# Patient Record
Sex: FEMALE | Race: WHITE | NOT HISPANIC OR LATINO | Employment: FULL TIME | URBAN - METROPOLITAN AREA
[De-identification: names, ages, dates, MRNs, and addresses within clinical notes are randomized per-mention and may not be internally consistent; named-entity substitution may affect disease eponyms.]

---

## 2024-01-04 ENCOUNTER — LAB (OUTPATIENT)
Dept: OBSTETRICS AND GYNECOLOGY | Facility: CLINIC | Age: 29
End: 2024-01-04
Payer: COMMERCIAL

## 2024-01-04 DIAGNOSIS — R73.09 ELEVATED GLUCOSE TOLERANCE TEST: Primary | ICD-10-CM

## 2024-01-10 ENCOUNTER — TELEPHONE (OUTPATIENT)
Dept: OBSTETRICS AND GYNECOLOGY | Facility: CLINIC | Age: 29
End: 2024-01-10
Payer: COMMERCIAL

## 2024-01-12 ENCOUNTER — ROUTINE PRENATAL (OUTPATIENT)
Dept: OBSTETRICS AND GYNECOLOGY | Facility: CLINIC | Age: 29
End: 2024-01-12
Payer: COMMERCIAL

## 2024-01-12 ENCOUNTER — HOSPITAL ENCOUNTER (INPATIENT)
Facility: HOSPITAL | Age: 29
LOS: 8 days | Discharge: HOME | End: 2024-01-20
Attending: OBSTETRICS & GYNECOLOGY | Admitting: OBSTETRICS & GYNECOLOGY
Payer: COMMERCIAL

## 2024-01-12 ENCOUNTER — TELEPHONE (OUTPATIENT)
Dept: OBSTETRICS AND GYNECOLOGY | Facility: CLINIC | Age: 29
End: 2024-01-12
Payer: COMMERCIAL

## 2024-01-12 VITALS — WEIGHT: 134 LBS | BODY MASS INDEX: 23 KG/M2 | SYSTOLIC BLOOD PRESSURE: 104 MMHG | DIASTOLIC BLOOD PRESSURE: 68 MMHG

## 2024-01-12 DIAGNOSIS — Z34.03 PRENATAL CARE, FIRST PREGNANCY, THIRD TRIMESTER: Primary | ICD-10-CM

## 2024-01-12 DIAGNOSIS — O60.03 PRETERM LABOR IN THIRD TRIMESTER WITHOUT DELIVERY: ICD-10-CM

## 2024-01-12 PROBLEM — O60.00 PRETERM LABOR: Status: ACTIVE | Noted: 2024-01-12

## 2024-01-12 LAB
ABO GROUP BLD: NORMAL
ABO GROUP BLD: NORMAL
ALP SERPL-CCNC: 98 U/L (ref 39–117)
ALT SERPL W P-5'-P-CCNC: 10 U/L (ref 1–33)
AST SERPL-CCNC: 15 U/L (ref 1–32)
BACTERIA UR QL AUTO: ABNORMAL /HPF
BASOPHILS # BLD AUTO: 0.08 10*3/MM3 (ref 0–0.2)
BASOPHILS NFR BLD AUTO: 0.7 % (ref 0–1.5)
BILIRUB BLD-MCNC: NEGATIVE MG/DL
BILIRUB SERPL-MCNC: 0.2 MG/DL (ref 0–1.2)
BILIRUB UR QL STRIP: NEGATIVE
BLD GP AB SCN SERPL QL: NEGATIVE
CLARITY UR: CLEAR
CLARITY, POC: CLEAR
COLOR UR: YELLOW
COLOR UR: YELLOW
CREAT SERPL-MCNC: 0.58 MG/DL (ref 0.57–1)
DEPRECATED RDW RBC AUTO: 41.3 FL (ref 37–54)
EOSINOPHIL # BLD AUTO: 0.15 10*3/MM3 (ref 0–0.4)
EOSINOPHIL NFR BLD AUTO: 1.2 % (ref 0.3–6.2)
ERYTHROCYTE [DISTWIDTH] IN BLOOD BY AUTOMATED COUNT: 12.6 % (ref 12.3–15.4)
GLUCOSE UR STRIP-MCNC: NEGATIVE MG/DL
GLUCOSE UR STRIP-MCNC: NEGATIVE MG/DL
HCT VFR BLD AUTO: 35.4 % (ref 34–46.6)
HGB BLD-MCNC: 12.3 G/DL (ref 12–15.9)
HGB UR QL STRIP.AUTO: ABNORMAL
HYALINE CASTS UR QL AUTO: ABNORMAL /LPF
IMM GRANULOCYTES # BLD AUTO: 0.06 10*3/MM3 (ref 0–0.05)
IMM GRANULOCYTES NFR BLD AUTO: 0.5 % (ref 0–0.5)
KETONES UR QL STRIP: NEGATIVE
KETONES UR QL: NEGATIVE
LDH SERPL-CCNC: 207 U/L (ref 135–214)
LEUKOCYTE EST, POC: NEGATIVE
LEUKOCYTE ESTERASE UR QL STRIP.AUTO: NEGATIVE
LYMPHOCYTES # BLD AUTO: 2.54 10*3/MM3 (ref 0.7–3.1)
LYMPHOCYTES NFR BLD AUTO: 20.7 % (ref 19.6–45.3)
MCH RBC QN AUTO: 31.7 PG (ref 26.6–33)
MCHC RBC AUTO-ENTMCNC: 34.7 G/DL (ref 31.5–35.7)
MCV RBC AUTO: 91.2 FL (ref 79–97)
MONOCYTES # BLD AUTO: 0.69 10*3/MM3 (ref 0.1–0.9)
MONOCYTES NFR BLD AUTO: 5.6 % (ref 5–12)
NEUTROPHILS NFR BLD AUTO: 71.3 % (ref 42.7–76)
NEUTROPHILS NFR BLD AUTO: 8.77 10*3/MM3 (ref 1.7–7)
NITRITE UR QL STRIP: NEGATIVE
NITRITE UR-MCNC: NEGATIVE MG/ML
NRBC BLD AUTO-RTO: 0 /100 WBC (ref 0–0.2)
PH UR STRIP.AUTO: 6.5 [PH] (ref 5–8)
PH UR: 6 [PH] (ref 5–8)
PLATELET # BLD AUTO: 199 10*3/MM3 (ref 140–450)
PMV BLD AUTO: 10.9 FL (ref 6–12)
PROT UR QL STRIP: NEGATIVE
PROT UR STRIP-MCNC: NEGATIVE MG/DL
RBC # BLD AUTO: 3.88 10*6/MM3 (ref 3.77–5.28)
RBC # UR STRIP: ABNORMAL /HPF
RBC # UR STRIP: NEGATIVE /UL
REF LAB TEST METHOD: ABNORMAL
RH BLD: POSITIVE
RH BLD: POSITIVE
SP GR UR STRIP: 1.01 (ref 1–1.03)
SP GR UR: 1.01 (ref 1–1.03)
SQUAMOUS #/AREA URNS HPF: ABNORMAL /HPF
T&S EXPIRATION DATE: NORMAL
URATE SERPL-MCNC: 2.7 MG/DL (ref 2.4–5.7)
UROBILINOGEN UR QL STRIP: ABNORMAL
UROBILINOGEN UR QL: NORMAL
WBC # UR STRIP: ABNORMAL /HPF
WBC NRBC COR # BLD AUTO: 12.29 10*3/MM3 (ref 3.4–10.8)

## 2024-01-12 PROCEDURE — 86900 BLOOD TYPING SEROLOGIC ABO: CPT | Performed by: OBSTETRICS & GYNECOLOGY

## 2024-01-12 PROCEDURE — 59025 FETAL NON-STRESS TEST: CPT | Performed by: OBSTETRICS & GYNECOLOGY

## 2024-01-12 PROCEDURE — 25010000002 TERBUTALINE PER 1 MG

## 2024-01-12 PROCEDURE — 99222 1ST HOSP IP/OBS MODERATE 55: CPT | Performed by: OBSTETRICS & GYNECOLOGY

## 2024-01-12 PROCEDURE — 84550 ASSAY OF BLOOD/URIC ACID: CPT | Performed by: OBSTETRICS & GYNECOLOGY

## 2024-01-12 PROCEDURE — 25010000002 ONDANSETRON PER 1 MG: Performed by: OBSTETRICS & GYNECOLOGY

## 2024-01-12 PROCEDURE — 25010000002 CEFAZOLIN PER 500 MG: Performed by: OBSTETRICS & GYNECOLOGY

## 2024-01-12 PROCEDURE — 82247 BILIRUBIN TOTAL: CPT | Performed by: OBSTETRICS & GYNECOLOGY

## 2024-01-12 PROCEDURE — 86901 BLOOD TYPING SEROLOGIC RH(D): CPT

## 2024-01-12 PROCEDURE — 25810000003 LACTATED RINGERS PER 1000 ML: Performed by: OBSTETRICS & GYNECOLOGY

## 2024-01-12 PROCEDURE — 82565 ASSAY OF CREATININE: CPT | Performed by: OBSTETRICS & GYNECOLOGY

## 2024-01-12 PROCEDURE — 86850 RBC ANTIBODY SCREEN: CPT | Performed by: OBSTETRICS & GYNECOLOGY

## 2024-01-12 PROCEDURE — 25010000002 BETAMETHASONE ACET & SOD PHOS PER 4 MG: Performed by: OBSTETRICS & GYNECOLOGY

## 2024-01-12 PROCEDURE — 84075 ASSAY ALKALINE PHOSPHATASE: CPT | Performed by: OBSTETRICS & GYNECOLOGY

## 2024-01-12 PROCEDURE — 83615 LACTATE (LD) (LDH) ENZYME: CPT | Performed by: OBSTETRICS & GYNECOLOGY

## 2024-01-12 PROCEDURE — 84460 ALANINE AMINO (ALT) (SGPT): CPT | Performed by: OBSTETRICS & GYNECOLOGY

## 2024-01-12 PROCEDURE — 59025 FETAL NON-STRESS TEST: CPT

## 2024-01-12 PROCEDURE — 25010000002 MAGNESIUM SULFATE 20 GM/500ML SOLUTION: Performed by: OBSTETRICS & GYNECOLOGY

## 2024-01-12 PROCEDURE — 25810000003 LACTATED RINGERS SOLUTION: Performed by: OBSTETRICS & GYNECOLOGY

## 2024-01-12 PROCEDURE — 87081 CULTURE SCREEN ONLY: CPT | Performed by: OBSTETRICS & GYNECOLOGY

## 2024-01-12 PROCEDURE — 87086 URINE CULTURE/COLONY COUNT: CPT | Performed by: OBSTETRICS & GYNECOLOGY

## 2024-01-12 PROCEDURE — 81001 URINALYSIS AUTO W/SCOPE: CPT | Performed by: OBSTETRICS & GYNECOLOGY

## 2024-01-12 PROCEDURE — 85025 COMPLETE CBC W/AUTO DIFF WBC: CPT | Performed by: OBSTETRICS & GYNECOLOGY

## 2024-01-12 PROCEDURE — 84450 TRANSFERASE (AST) (SGOT): CPT | Performed by: OBSTETRICS & GYNECOLOGY

## 2024-01-12 PROCEDURE — 86900 BLOOD TYPING SEROLOGIC ABO: CPT

## 2024-01-12 PROCEDURE — 86901 BLOOD TYPING SEROLOGIC RH(D): CPT | Performed by: OBSTETRICS & GYNECOLOGY

## 2024-01-12 RX ORDER — TERBUTALINE SULFATE 1 MG/ML
INJECTION, SOLUTION SUBCUTANEOUS
Status: COMPLETED
Start: 2024-01-12 | End: 2024-01-12

## 2024-01-12 RX ORDER — SODIUM CHLORIDE, SODIUM LACTATE, POTASSIUM CHLORIDE, CALCIUM CHLORIDE 600; 310; 30; 20 MG/100ML; MG/100ML; MG/100ML; MG/100ML
75 INJECTION, SOLUTION INTRAVENOUS CONTINUOUS
Status: DISCONTINUED | OUTPATIENT
Start: 2024-01-12 | End: 2024-01-18 | Stop reason: SDUPTHER

## 2024-01-12 RX ORDER — SODIUM CHLORIDE 0.9 % (FLUSH) 0.9 %
10 SYRINGE (ML) INJECTION EVERY 12 HOURS SCHEDULED
Status: DISCONTINUED | OUTPATIENT
Start: 2024-01-12 | End: 2024-01-18

## 2024-01-12 RX ORDER — ONDANSETRON 4 MG/1
8 TABLET, ORALLY DISINTEGRATING ORAL EVERY 8 HOURS PRN
Status: DISCONTINUED | OUTPATIENT
Start: 2024-01-12 | End: 2024-01-18 | Stop reason: SDUPTHER

## 2024-01-12 RX ORDER — ONDANSETRON 2 MG/ML
4 INJECTION INTRAMUSCULAR; INTRAVENOUS EVERY 8 HOURS PRN
Status: DISCONTINUED | OUTPATIENT
Start: 2024-01-12 | End: 2024-01-18 | Stop reason: SDUPTHER

## 2024-01-12 RX ORDER — DOCUSATE SODIUM 100 MG/1
100 CAPSULE, LIQUID FILLED ORAL 2 TIMES DAILY PRN
Status: DISCONTINUED | OUTPATIENT
Start: 2024-01-12 | End: 2024-01-18

## 2024-01-12 RX ORDER — MAGNESIUM SULFATE HEPTAHYDRATE 40 MG/ML
2 INJECTION, SOLUTION INTRAVENOUS CONTINUOUS
Status: DISCONTINUED | OUTPATIENT
Start: 2024-01-12 | End: 2024-01-14

## 2024-01-12 RX ORDER — INDOMETHACIN 25 MG/1
25 CAPSULE ORAL EVERY 4 HOURS
Status: COMPLETED | OUTPATIENT
Start: 2024-01-12 | End: 2024-01-14

## 2024-01-12 RX ORDER — CALCIUM CARBONATE 500 MG/1
1 TABLET, CHEWABLE ORAL DAILY PRN
Status: DISCONTINUED | OUTPATIENT
Start: 2024-01-12 | End: 2024-01-18 | Stop reason: ALTCHOICE

## 2024-01-12 RX ORDER — LIDOCAINE HYDROCHLORIDE 10 MG/ML
0.5 INJECTION, SOLUTION EPIDURAL; INFILTRATION; INTRACAUDAL; PERINEURAL ONCE AS NEEDED
Status: DISCONTINUED | OUTPATIENT
Start: 2024-01-12 | End: 2024-01-18 | Stop reason: SDUPTHER

## 2024-01-12 RX ORDER — INDOMETHACIN 25 MG/1
50 CAPSULE ORAL ONCE
Status: COMPLETED | OUTPATIENT
Start: 2024-01-12 | End: 2024-01-12

## 2024-01-12 RX ORDER — SODIUM CHLORIDE 0.9 % (FLUSH) 0.9 %
10 SYRINGE (ML) INJECTION AS NEEDED
Status: DISCONTINUED | OUTPATIENT
Start: 2024-01-12 | End: 2024-01-18 | Stop reason: SDUPTHER

## 2024-01-12 RX ORDER — SODIUM CHLORIDE 9 MG/ML
40 INJECTION, SOLUTION INTRAVENOUS AS NEEDED
Status: DISCONTINUED | OUTPATIENT
Start: 2024-01-12 | End: 2024-01-18 | Stop reason: SDUPTHER

## 2024-01-12 RX ORDER — ACETAMINOPHEN 325 MG/1
650 TABLET ORAL EVERY 4 HOURS PRN
Status: DISCONTINUED | OUTPATIENT
Start: 2024-01-12 | End: 2024-01-18 | Stop reason: SDUPTHER

## 2024-01-12 RX ORDER — BISACODYL 10 MG
10 SUPPOSITORY, RECTAL RECTAL DAILY PRN
Status: DISCONTINUED | OUTPATIENT
Start: 2024-01-12 | End: 2024-01-18

## 2024-01-12 RX ORDER — BETAMETHASONE SODIUM PHOSPHATE AND BETAMETHASONE ACETATE 3; 3 MG/ML; MG/ML
12 INJECTION, SUSPENSION INTRA-ARTICULAR; INTRALESIONAL; INTRAMUSCULAR; SOFT TISSUE EVERY 24 HOURS
Status: COMPLETED | OUTPATIENT
Start: 2024-01-12 | End: 2024-01-13

## 2024-01-12 RX ADMIN — INDOMETHACIN 25 MG: 25 CAPSULE ORAL at 23:03

## 2024-01-12 RX ADMIN — SODIUM CHLORIDE, POTASSIUM CHLORIDE, SODIUM LACTATE AND CALCIUM CHLORIDE 50 ML/HR: 600; 310; 30; 20 INJECTION, SOLUTION INTRAVENOUS at 19:00

## 2024-01-12 RX ADMIN — SODIUM CHLORIDE, POTASSIUM CHLORIDE, SODIUM LACTATE AND CALCIUM CHLORIDE 1000 ML: 600; 310; 30; 20 INJECTION, SOLUTION INTRAVENOUS at 14:55

## 2024-01-12 RX ADMIN — INDOMETHACIN 50 MG: 25 CAPSULE ORAL at 15:58

## 2024-01-12 RX ADMIN — ONDANSETRON 4 MG: 2 INJECTION INTRAMUSCULAR; INTRAVENOUS at 20:43

## 2024-01-12 RX ADMIN — INDOMETHACIN 25 MG: 25 CAPSULE ORAL at 19:48

## 2024-01-12 RX ADMIN — SODIUM CHLORIDE 2000 MG: 900 INJECTION INTRAVENOUS at 15:05

## 2024-01-12 RX ADMIN — Medication 10 ML: at 21:00

## 2024-01-12 RX ADMIN — BETAMETHASONE SODIUM PHOSPHATE AND BETAMETHASONE ACETATE 12 MG: 3; 3 INJECTION, SUSPENSION INTRA-ARTICULAR; INTRALESIONAL; INTRAMUSCULAR at 15:05

## 2024-01-12 RX ADMIN — MAGNESIUM SULFATE HEPTAHYDRATE 3 G/HR: 40 INJECTION, SOLUTION INTRAVENOUS at 19:57

## 2024-01-12 RX ADMIN — CEFAZOLIN 1000 MG: 1 INJECTION, POWDER, FOR SOLUTION INTRAMUSCULAR; INTRAVENOUS at 22:07

## 2024-01-12 RX ADMIN — TERBUTALINE SULFATE 1 MG: 1 INJECTION, SOLUTION SUBCUTANEOUS at 16:00

## 2024-01-12 NOTE — PROGRESS NOTES
"      OB FOLLOW UP  CC- Here for care of pregnancy        Monica Lees is a 28 y.o.  30w3d patient being seen today for c/o frequent mild cramping x 15 hours.  She also c/o increased clear watery discharge. Patient reports when she stood up this morning \"it just came out\". Over the past hour she has had 8 cramps/contractions that she's been able to track. Patient reports when she wiped this morning she had a brownish tinge on her toilet paper now it is red.(Only when wiping). Patient reports she has been feeling like she wants to have a BM.     Her prenatal care is complicated by (and status) :    Patient Active Problem List   Diagnosis    Anxiety    Eating disorder    Pregnancy     labor         Ultrasound Today: No.  NST Today: Yes .  Reactive, 20+ minutes, indication: cramping and spotting  ROS -   Patient Reports :  see above  Patient Denies: Loss of Fluid, Vaginal Spotting, Vision Changes, Headaches, Nausea , and Vomiting   Fetal Movement : normal  All other systems reviewed and are negative.       The additional following portions of the patient's history were reviewed and updated as appropriate: allergies and current medications.    I have reviewed and agree with the HPI, ROS, and historical information as entered above. Kaya Payton, APRN      /68   Wt 60.8 kg (134 lb)   BMI 23.00 kg/m²       EXAM:     Prenatal Vitals  BP: 104/68  Weight: 60.8 kg (134 lb)        Cervix 3 cm, vertex.  Speculum exam: no pooling, bleeding; cervix dilated             Assessment and Plan    Problem List Items Addressed This Visit          Gravid and      labor     Other Visit Diagnoses       Prenatal care, first pregnancy, third trimester    -  Primary    Relevant Orders    POC Urinalysis Dipstick (Completed)            Pregnancy at 30w3d  Cervix dilated.  To .  Notified charge nurse    Kaya Payton, KATIE  2024   "

## 2024-01-12 NOTE — TELEPHONE ENCOUNTER
33 37- [place] patient    Patient called with c/o frequent mild cramping x 15 hours.  She also c/o increased watery discharge.  Over the past hour she has had 8 cramps/contractions that she's been able to track.  She denies vaginal bleeding, and admits adequate fetal movement.    Patient scheduled for appt in [place] with Marcia at 12:30.

## 2024-01-12 NOTE — NURSING NOTE
PRENATAL CONTACT - NDRT    Requested by OB to meet with parents to update them with delivery and admission procedures for their infant.    Present:  Mom and Dad    Pertinent Prenatal Information:  None    Gestational Age: 30 3/7 weeks  US report:Normal at the first anatomy scan  Other: ALINE, dilated, bulging bag      The following issues were discussed:    1) Admission Procedure.  2) NICU Visitation Policy.  3) Sibling Visitation Policy: NA  4) Skin to Skin Care ([facility]).  5) Hand Expression for Breast Milk soon after birth.  6) Breast Feeding/Expressing Breast Milk.  7) Tour of NICU offered.  8) Other Issues Discussed: Length of stay, pump for preemies      Concerns Voiced by Parents: Concerned of length of stay         Evelyn Reagan RN    1/12/2024   16:24 EST

## 2024-01-12 NOTE — PROGRESS NOTES
LAborist    Chart reviewed.      Urinalysis normal limit  CBC and preeclampsia with normal limit  Fetal heart rate tracing   reactive with continued contractions 3 to 5 minutes apart.    PLAN  Increase magnesium sulfate to 3 g/h, add Indocin 48 hours.

## 2024-01-13 LAB — BACTERIA SPEC AEROBE CULT: NO GROWTH

## 2024-01-13 PROCEDURE — 25010000002 CEFAZOLIN PER 500 MG: Performed by: OBSTETRICS & GYNECOLOGY

## 2024-01-13 PROCEDURE — 59025 FETAL NON-STRESS TEST: CPT | Performed by: OBSTETRICS & GYNECOLOGY

## 2024-01-13 PROCEDURE — 25010000002 MAGNESIUM SULFATE 20 GM/500ML SOLUTION: Performed by: OBSTETRICS & GYNECOLOGY

## 2024-01-13 PROCEDURE — 99231 SBSQ HOSP IP/OBS SF/LOW 25: CPT | Performed by: OBSTETRICS & GYNECOLOGY

## 2024-01-13 PROCEDURE — 25010000002 BETAMETHASONE ACET & SOD PHOS PER 4 MG: Performed by: OBSTETRICS & GYNECOLOGY

## 2024-01-13 RX ORDER — CALCIUM CARBONATE 500 MG/1
2 TABLET, CHEWABLE ORAL 3 TIMES DAILY PRN
Status: DISCONTINUED | OUTPATIENT
Start: 2024-01-13 | End: 2024-01-18

## 2024-01-13 RX ADMIN — CEFAZOLIN 1000 MG: 1 INJECTION, POWDER, FOR SOLUTION INTRAMUSCULAR; INTRAVENOUS at 22:11

## 2024-01-13 RX ADMIN — CALCIUM CARBONATE (ANTACID) CHEW TAB 500 MG 1 TABLET: 500 CHEW TAB at 18:35

## 2024-01-13 RX ADMIN — INDOMETHACIN 25 MG: 25 CAPSULE ORAL at 04:36

## 2024-01-13 RX ADMIN — MAGNESIUM SULFATE HEPTAHYDRATE 2 G/HR: 40 INJECTION, SOLUTION INTRAVENOUS at 13:58

## 2024-01-13 RX ADMIN — INDOMETHACIN 25 MG: 25 CAPSULE ORAL at 11:37

## 2024-01-13 RX ADMIN — CALCIUM CARBONATE (ANTACID) CHEW TAB 500 MG 1 TABLET: 500 CHEW TAB at 23:03

## 2024-01-13 RX ADMIN — MAGNESIUM SULFATE HEPTAHYDRATE 2 G/HR: 40 INJECTION, SOLUTION INTRAVENOUS at 01:27

## 2024-01-13 RX ADMIN — CEFAZOLIN 1000 MG: 1 INJECTION, POWDER, FOR SOLUTION INTRAMUSCULAR; INTRAVENOUS at 06:16

## 2024-01-13 RX ADMIN — INDOMETHACIN 25 MG: 25 CAPSULE ORAL at 19:58

## 2024-01-13 RX ADMIN — INDOMETHACIN 25 MG: 25 CAPSULE ORAL at 15:55

## 2024-01-13 RX ADMIN — INDOMETHACIN 25 MG: 25 CAPSULE ORAL at 08:19

## 2024-01-13 RX ADMIN — BETAMETHASONE SODIUM PHOSPHATE AND BETAMETHASONE ACETATE 12 MG: 3; 3 INJECTION, SUSPENSION INTRA-ARTICULAR; INTRALESIONAL; INTRAMUSCULAR at 15:27

## 2024-01-13 RX ADMIN — CEFAZOLIN 1000 MG: 1 INJECTION, POWDER, FOR SOLUTION INTRAMUSCULAR; INTRAVENOUS at 13:58

## 2024-01-13 NOTE — PROGRESS NOTES
LISSETTE Thomas  Monica Lees  : 1995  MRN: [id number]  CSN: [id number]    Antepartum Progress Note  HD#2  CC: hospital follow of threatened  labor     Subjective   She has more constant cramping but no contractions this morning.   She is feeling fetal movements.   She denies LOF.   She has had not bright red bleeding. She notices occasional brown discharge.   She had some intermittent rectal pressure this morning but none currently.      Objective     Min/max vitals past 24 hours:   Temp  Min: 97.8 °F (36.6 °C)  Max: 98.3 °F (36.8 °C)  BP  Min: 98/61  Max: 131/80  Pulse  Min: 75  Max: 121  Resp  Min: 16  Max: 16         General: well developed; well nourished  no acute distress   Heart: Not performed.   Lungs: breathing is unlabored   Abdomen: soft, non-tender; no masses  no umbilical or inguinal hernias are present  no hepato-splenomegaly   FHT's: reassuring and category 1   Cervix: was not checked.   Contractions: none             CBC w/ diff:   Lab Results   Component Value Date    WBC 12.29 (H) 2024    NEUTRORELPCT 71.3 2024    AUTOIGPER 0.5 2024    LYMPHORELPCT 20.7 2024    MONORELPCT 5.6 2024    EOSRELPCT 1.2 2024    BASORELPCT 0.7 2024    HGB 12.3 2024    HCT 35.4 2024    MCV 91.2 2024    RDW 12.6 2024     2024     Pre-eclampsia Panel:   Lab Results   Component Value Date    ALKPHOS 98 2024    AST 15 2024    ALT 10 2024    BILITOT 0.2 2024     2024    URICACID 2.7 2024    CREATININE 0.58 2024     UA:    Lab Results   Component Value Date    SQUAMEPIUA 3-6 (A) 2024    SPECGRAVUR 1.007 2024    KETONESU Negative 2024    BLOODU Moderate (2+) (A) 2024    LEUKOCYTESUR Negative 2024    NITRITEU Negative 2024    RBCUA 0-2 2024    WBCUA 0-2 2024    BACTERIA Trace 2024       Assessment   IUP at 30w4d  Threatened   labor with advanced cervical dilation   Fetal status reassuring      Plan   Continue 48 hours course of indocin. Continue magnesium sulfate through steroid window tomorrow afternoon.   Continue abx for GBS unknown (result pending)  Complete BMZ course today   Continue NST TID    Swapna Packer MD  2024  13:35 EST        Non Stress Test     [place]  Patient: Monica Lees  : 1995  MRN: [id number]  CSN: [id number]  Date: 2024    Estimated Date of Delivery: 3/19/24  Gestational Age: 30w4d    Indication for NST Threatened  labor   Total time > 20 minutes                    Interpretation    Baseline  beats per minute   Accelerations  Present   Decelerations Absent       Additional Comments Reactive and reassuring       Recommendations for f/u Continue TID NSTs       This note has been electronically signed.    Swapna Packer MD

## 2024-01-14 PROCEDURE — 99231 SBSQ HOSP IP/OBS SF/LOW 25: CPT | Performed by: OBSTETRICS & GYNECOLOGY

## 2024-01-14 PROCEDURE — 59025 FETAL NON-STRESS TEST: CPT

## 2024-01-14 PROCEDURE — 25010000002 MAGNESIUM SULFATE 20 GM/500ML SOLUTION: Performed by: OBSTETRICS & GYNECOLOGY

## 2024-01-14 PROCEDURE — 25010000002 CEFAZOLIN PER 500 MG: Performed by: OBSTETRICS & GYNECOLOGY

## 2024-01-14 RX ADMIN — MAGNESIUM SULFATE HEPTAHYDRATE 2 G/HR: 40 INJECTION, SOLUTION INTRAVENOUS at 11:55

## 2024-01-14 RX ADMIN — MAGNESIUM SULFATE HEPTAHYDRATE 2 G/HR: 40 INJECTION, SOLUTION INTRAVENOUS at 00:54

## 2024-01-14 RX ADMIN — INDOMETHACIN 25 MG: 25 CAPSULE ORAL at 00:48

## 2024-01-14 RX ADMIN — INDOMETHACIN 25 MG: 25 CAPSULE ORAL at 15:49

## 2024-01-14 RX ADMIN — CEFAZOLIN 1000 MG: 1 INJECTION, POWDER, FOR SOLUTION INTRAMUSCULAR; INTRAVENOUS at 06:31

## 2024-01-14 RX ADMIN — INDOMETHACIN 25 MG: 25 CAPSULE ORAL at 07:59

## 2024-01-14 RX ADMIN — INDOMETHACIN 25 MG: 25 CAPSULE ORAL at 11:55

## 2024-01-14 RX ADMIN — INDOMETHACIN 25 MG: 25 CAPSULE ORAL at 04:31

## 2024-01-14 RX ADMIN — CEFAZOLIN 1000 MG: 1 INJECTION, POWDER, FOR SOLUTION INTRAMUSCULAR; INTRAVENOUS at 13:58

## 2024-01-14 NOTE — PROGRESS NOTES
"Labourist:      Called to see Monica due to more frequent contractions that she is now feeling.  She denies vaginal leaking.  She is still having some dark red or brownish \"clumpy\" discharge.       She had bee 3/70/-2 when last checked yesterday.    I rechecked her and she is now 3/8-/0 to +1 with bulging membranes.      Given a concern for immanent labour she was transferred to L&D.   She remains cephalic.  Plan:  Continue ABX  Continue Magnesium  Continue Indomethacin.  Expectant management  Notify NICU    "

## 2024-01-14 NOTE — PROGRESS NOTES
LISSETTE Thomas  Monica Lees  : 1995  MRN: [id number]  CSN: [id number]    Antepartum Progress Note  HD#3  CC:  hospital follow of threatened  labor    Subjective   She is eating now.   She is not having rhythmic cramping.   She has intermittent pressure and occasional spotting.   Good FM.     Objective     Min/max vitals past 24 hours:   Temp  Min: 97.7 °F (36.5 °C)  Max: 98.6 °F (37 °C)  BP  Min: 90/51  Max: 139/69  Pulse  Min: 56  Max: 89  Resp  Min: 14  Max: 18         General: well developed; well nourished  no acute distress   Heart: Not performed.   Lungs: breathing is unlabored   Abdomen: soft, non-tender; no masses  no umbilical or inguinal hernias are present  no hepato-splenomegaly   FHT's: reassuring and category 1   Cervix: was not checked.   Contractions: None to irregular              CBC:   Lab Results   Component Value Date    WBC 12.29 (H) 2024    HGB 12.3 2024    HCT 35.4 2024     2024     Pre-eclampsia Panel:   Lab Results   Component Value Date    ALKPHOS 98 2024    AST 15 2024    ALT 10 2024    BILITOT 0.2 2024     2024    URICACID 2.7 2024    CREATININE 0.58 2024       Assessment   IUP at 30w5d  Threatened  labor with advanced cervical dilation   Fetal status reassuring      Plan   She has completed 48 hours of magnesium and indocin. She is through her steroid window. Abx discontinued.   Okay to go to [facility] just understanding she may have to be moved back to [facility].   All questions were answered to the best of my ability.     Swapna Packer MD  2024  17:11 EST

## 2024-01-15 ENCOUNTER — APPOINTMENT (OUTPATIENT)
Dept: WOMENS IMAGING | Facility: HOSPITAL | Age: 29
End: 2024-01-15
Payer: COMMERCIAL

## 2024-01-15 LAB
ABO GROUP BLD: NORMAL
BACTERIA SPEC AEROBE CULT: NORMAL
BASOPHILS # BLD AUTO: 0.04 10*3/MM3 (ref 0–0.2)
BASOPHILS NFR BLD AUTO: 0.3 % (ref 0–1.5)
BLD GP AB SCN SERPL QL: NEGATIVE
DEPRECATED RDW RBC AUTO: 45.8 FL (ref 37–54)
EOSINOPHIL # BLD AUTO: 0.04 10*3/MM3 (ref 0–0.4)
EOSINOPHIL NFR BLD AUTO: 0.3 % (ref 0.3–6.2)
ERYTHROCYTE [DISTWIDTH] IN BLOOD BY AUTOMATED COUNT: 13.2 % (ref 12.3–15.4)
HCT VFR BLD AUTO: 31.7 % (ref 34–46.6)
HGB BLD-MCNC: 10.8 G/DL (ref 12–15.9)
IMM GRANULOCYTES # BLD AUTO: 0.1 10*3/MM3 (ref 0–0.05)
IMM GRANULOCYTES NFR BLD AUTO: 0.8 % (ref 0–0.5)
LYMPHOCYTES # BLD AUTO: 2.73 10*3/MM3 (ref 0.7–3.1)
LYMPHOCYTES NFR BLD AUTO: 22.6 % (ref 19.6–45.3)
MCH RBC QN AUTO: 32.4 PG (ref 26.6–33)
MCHC RBC AUTO-ENTMCNC: 34.1 G/DL (ref 31.5–35.7)
MCV RBC AUTO: 95.2 FL (ref 79–97)
MONOCYTES # BLD AUTO: 0.95 10*3/MM3 (ref 0.1–0.9)
MONOCYTES NFR BLD AUTO: 7.9 % (ref 5–12)
NEUTROPHILS NFR BLD AUTO: 68.1 % (ref 42.7–76)
NEUTROPHILS NFR BLD AUTO: 8.21 10*3/MM3 (ref 1.7–7)
NRBC BLD AUTO-RTO: 0 /100 WBC (ref 0–0.2)
PLATELET # BLD AUTO: 194 10*3/MM3 (ref 140–450)
PMV BLD AUTO: 11.3 FL (ref 6–12)
RBC # BLD AUTO: 3.33 10*6/MM3 (ref 3.77–5.28)
RH BLD: POSITIVE
T&S EXPIRATION DATE: NORMAL
WBC NRBC COR # BLD AUTO: 12.07 10*3/MM3 (ref 3.4–10.8)

## 2024-01-15 PROCEDURE — 86901 BLOOD TYPING SEROLOGIC RH(D): CPT | Performed by: OBSTETRICS & GYNECOLOGY

## 2024-01-15 PROCEDURE — 86900 BLOOD TYPING SEROLOGIC ABO: CPT | Performed by: OBSTETRICS & GYNECOLOGY

## 2024-01-15 PROCEDURE — 76820 UMBILICAL ARTERY ECHO: CPT | Performed by: OBSTETRICS & GYNECOLOGY

## 2024-01-15 PROCEDURE — 86850 RBC ANTIBODY SCREEN: CPT | Performed by: OBSTETRICS & GYNECOLOGY

## 2024-01-15 PROCEDURE — 59025 FETAL NON-STRESS TEST: CPT | Performed by: OBSTETRICS & GYNECOLOGY

## 2024-01-15 PROCEDURE — 59025 FETAL NON-STRESS TEST: CPT

## 2024-01-15 PROCEDURE — 76811 OB US DETAILED SNGL FETUS: CPT

## 2024-01-15 PROCEDURE — 85025 COMPLETE CBC W/AUTO DIFF WBC: CPT | Performed by: OBSTETRICS & GYNECOLOGY

## 2024-01-15 PROCEDURE — 99232 SBSQ HOSP IP/OBS MODERATE 35: CPT | Performed by: OBSTETRICS & GYNECOLOGY

## 2024-01-15 PROCEDURE — 76811 OB US DETAILED SNGL FETUS: CPT | Performed by: OBSTETRICS & GYNECOLOGY

## 2024-01-15 RX ADMIN — Medication 10 ML: at 21:02

## 2024-01-15 NOTE — PLAN OF CARE
Problem: Adult Inpatient Plan of Care  Goal: Plan of Care Review  Outcome: Ongoing, Progressing   Goal Outcome Evaluation:Improving     Patient denies any bleeding, leakage of fluid, contractions or any decreased fetal movement pattern.  Patient denies any pain, visual changes, or headache.

## 2024-01-15 NOTE — PROGRESS NOTES
1/15/2024  HD:3  28 y.o. female  at 30w6d    Subjective   Monica has no complaints felt some cramping this morning. No leaking fluid, no vaginal bleeding. +FM. .       Objective   Temp: Temp:  [97.9 °F (36.6 °C)-99 °F (37.2 °C)] 99 °F (37.2 °C) Temp src: Oral   BP: BP: ()/(55-71) 109/71        Pulse: Heart Rate:  [53-73] 59  RR: Resp:  [16-20] 16    General:  nad   Abdomen: Gravid, nontender         Lab Results   Component Value Date    WBC 12.29 (H) 2024    HGB 12.3 2024    HCT 35.4 2024    MCV 91.2 2024     2024    HEPBSAG Negative 2023     Results from last 7 days   Lab Units 24  1444   AST (SGOT) U/L 15     Results from last 7 days   Lab Units 24  1444   ALT (SGPT) U/L 10      Results from last 7 days   Lab Units 24  1444   CREATININE mg/dL 0.58      Results from last 7 days   Lab Units 24  1444   BILIRUBIN mg/dL 0.2   Non Stress Test: minutes 20  non-stress test: NST: Reactive  indication: Questionable Labor        Assessment  1.   28 y.o. yo female  at 30w6d  2. ALINE. Currently stable. She is s/p steroids. PDC US this AM, normal growth and fluid. Vtx. Normal dopplers.     Plan  Cont current hospital care.   2.  Will cont in patient management for now to ensure stability. She lives approx 30-35 min away from hospital. Discuss with PDC.     This note has been electronically signed.    Mary Garcia MD  January 15, 2024

## 2024-01-15 NOTE — PAYOR COMM NOTE
"Kiran Lees (28 y.o. Female)     Address:  [address] [place]  [address]  PHONE #[phone]    FACILITY:  [facility]  NPI#[id number]  TAX ID#[id number]  [address] [place]  [address]  PHONE #[phone]    PHYSICIAN:  MARINA GARCIA   NPI#[id number]  [address] [facility] [address]  [place]  [address]  PHONE#[phone]    DIAGNOSIS CODE:  O60.00   LABOR    FROM: Deidre Pace LPN, Utilization Review  Phone #[phone]  Fax #[phone]        Date of Birth   1995    Social Security Number       Address   [address] [place] [address]    Home Phone   [phone]    MRN   [id number]       Spiritism   None    Marital Status                               Admission Date   24    Admission Type   Elective    Admitting Provider   Marina Garcia MD    Attending Provider   Marina Garcia MD    Department, Room/Bed   [facility] ANTEPARTUM, N324/       Discharge Date       Discharge Disposition       Discharge Destination                                 Attending Provider: Marina Garcia MD    Allergies: Amoxicillin, Penicillins    Isolation: None   Infection: None   Code Status: CPR    Ht: 162.6 cm (64\")   Wt: 60.8 kg (134 lb)    Admission Cmt: None   Principal Problem:  labor [O60.00]                   Active Insurance as of 2024       Primary Coverage       Payor Plan Insurance Group Employer/Plan Group    [facility] BLUE CROSS [facility] BL[facility]OSS BLUE [facility] PPO [id number]       Payor Plan Address Payor Plan Phone Number Payor Plan Fax Number Effective Dates    [address] [phone]  2023 - None Entered    [facility] [id number]         Subscriber Name Subscriber Birth Date Member ID       KIRAN LEES 1995 [id number]                     Emergency Contacts        (Rel.) Home Phone Work Phone Mobile Phone    KEELEY MELARA (Spouse) -- " "-- [phone]    NEEL ORDONEZ (Sister) -- -- [phone]              Insurance Information                  [facility] BL[facility] CROSS BLUE [facility] PPO Phone: [phone]    Subscriber: Monica Villagran Subscriber#: [id number]    Group#: [id number] Precert#: --             History & Physical        William Saab, DO at [address] [place] [address]  Obstetric History and Physical    Referring Provider: Mary Garcia MD      CC:  labor.    Subjective    Patient is a 28 y.o. female  currently at 30w3d, who presents the office for evaluation of  labor.  Patient presents the office today with complaint of abdominal cramping and vaginal spotting.  Patient reports having intercourse yesterday however did not start spotting until today.  Today she revealed contractions 45 to 60 seconds 8/h throughout the day.  Patient reports normal fetal activity.   course uncomplicated until today.        The following portions of the patients history were reviewed and updated as appropriate: current medications, allergies, past medical history, past surgical history, past family history, past social history, and problem list .       Prenatal Information:   Maternal Prenatal Labs  Blood Type No results found for: \"ABO\"   Rh Status No results found for: \"RH\"   Antibody Screen No results found for: \"ABSCRN\"   Gonnorhea No results found for: \"GCCX\"   Chlamydia No results found for: \"CLAMYDCU\"   RPR No results found for: \"RPR\"   Syphilis Antibody No results found for: \"SYPHILIS\"   Rubella No results found for: \"RUBELLAIGGIN\"   Hepatitis B Surface Antigen No results found for: \"HEPBSAG\"   HIV-1 Antibody No results found for: \"LABHIV1\"   Hepatitis C Antibody No results found for: \"HEPCAB\"   Rapid Urin Drug Screen No results found for: \"AMPMETHU\", \"BARBITSCNUR\", \"LABBENZSCN\", \"LABMETHSCN\", \"LABOPIASCN\", \"THCURSCR\", \"COCAINEUR\", \"AMPHETSCREEN\", \"PROPOXSCN\", \"BUPRENORSCNU\", " "\"METAMPSCNUR\", \"OXYCODONESCN\", \"TRICYCLICSCN\"   Group B Strep Culture No results found for: \"GBSANTIGEN\"           External Prenatal Results       Pregnancy Outside Results - Transcribed From Office Records - See Scanned Records For Details       Test Value Date Time    ABO ^ A  23     Rh ^ Positive  23     Antibody Screen  Negative  23 0910      ^ Normal  23     Varicella IgG       Rubella ^ positive  23     Hgb  11.5 g/dL 23 0910      ^ 13.6 g/dL 23 1520    Hct  33.6 % 23 0910      ^ 39.9 % 23 1520    Glucose Fasting GTT  67 mg/dL 24 1038    Glucose Tolerance Test 1 hour  118 mg/dL 24 1038    Glucose Tolerance Test 3 hour  57 mg/dL 24 1038    Gonorrhea (discrete)       Chlamydia (discrete)       RPR ^ Nonreactive  23 1520    VDRL       Syphilis Antibody       HBsAg ^ Negative  23 1520    Herpes Simplex Virus PCR       Herpes Simplex VIrus Culture       HIV ^ Non Reactive  23 1520      ^ negative  23     Hep C RNA Quant PCR       Hep C Antibody       AFP       Group B Strep       GBS Susceptibility to Clindamycin       GBS Susceptibility to Erythromycin       Fetal Fibronectin       Genetic Testing, Maternal Blood                 Drug Screening       Test Value Date Time    Urine Drug Screen       Amphetamine Screen       Barbiturate Screen ^ Negative  23 1503    Benzodiazepine Screen ^ Negative  23 1503    Methadone Screen ^ Negative  23 1503    Phencyclidine Screen       Opiates Screen ^ Negative  23 1503    THC Screen       Cocaine Screen ^ Negative  23 1503    Propoxyphene Screen       Buprenorphine Screen       Methamphetamine Screen       Oxycodone Screen ^ Negative  23 1503    Tricyclic Antidepressants Screen                 Legend    ^: Historical                              Past OB History:       OB History    Para Term  AB Living   1 0 0 0 0 0   SAB IAB Ectopic " Molar Multiple Live Births   0 0 0 0 0 0      # Outcome Date GA Lbr Connor/2nd Weight Sex Delivery Anes PTL Lv   1 Current                Past Medical History: Past Medical History:   Diagnosis Date    Anxiety     Eating disorder     anorexia binge/purge type. 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    Poison ivy dermatitis       Past Surgical History Past Surgical History:   Procedure Laterality Date    FRENULUM CLIPPING        Family History: Family History   Problem Relation Age of Onset    Skin cancer Mother     No Known Problems Father     Heart disease Maternal Aunt     Diabetes Paternal Grandmother     Breast cancer Paternal Grandmother 60    Melanoma Paternal Grandmother     Other Sister         laryngeal polyps    Thyroid disease Maternal Grandmother     No Known Problems Maternal Grandfather     No Known Problems Paternal Grandfather     No Known Problems Sister     No Known Problems Sister     No Known Problems Sister       Social History:  reports that she has never smoked. She has never used smokeless tobacco.   reports that she does not currently use alcohol after a past usage of about 1.0 standard drink of alcohol per week.   reports no history of drug use.                   General ROS Negative Findings:Headaches, Visual Changes, Epigastric pain, Anorexia, Nausia/Vomiting, and ROM    ROS     All other systems have been reviewed and are neg  Objective      Vital Signs Range for the last 24 hours  Temperature:     Temp Source:     BP: BP: (104)/(68) 104/68   Pulse:     Respirations:     SPO2:     O2 Amount (l/min):     O2 Devices     Weight: Weight:  [60.8 kg (134 lb)] 60.8 kg (134 lb)     Physical Examination:   General:   alert, appears stated age, and cooperative   Skin:   normal   HEENT:     Lungs:   clear to auscultation bilaterally   Heart:   regular rate and rhythm, S1, S2 normal, no murmur, click, rub or gallop   Gastrointestinal: Abdomen soft, gravid uterus, guarding benign exam   Lower Extremities: No  edema, no calf tenderness   : External genitalia: normal general appearance  Uterus: enlarged   Musculoskeletal:     Neuro: No focal deficits noted.         Presentation: vtx   Cervix: Exam by:  Kaiser   Dilation:  3+ mid position   Effacement:  70   Station:  -2       Fetal Heart Rate Assessment   Method:     Beats/min:     Baseline:     Varibility:     Accels:     Decels:     Tracing Category:     NST-indications  labor, interpretation reactive, moderate variability, accelerations present 15 x 15, no fetal deceleration noted, onset 1404 end time 1424, contractions every 2 to 3 minutes.  Uterine Assessment   Method:     Frequency (min):     Ctx Count in 10 min:     Duration:     Intensity:     Intensity by IUPC:     Resting Tone:     Resting Tone by IUPC:     [place] Units:       Laboratory Results:   Lab Results (last 24 hours)       ** No results found for the last 24 hours. **          Radiology Review:   Imaging Results (Last 24 Hours)       ** No results found for the last 24 hours. **          Other Studies:    Assessment & Plan       labor        Assessment:  1.  Intrauterine pregnancy at 30w3d weeks gestation with reactive fetal status.    2.   labor  3.    4.      Plan:  1.  Admit, labs, magnesium sulfate tocolysis, steroids for fetal benefit, GBS prophylaxis, and NICU consult.  2. Plan of care has been reviewed with patient.  3.  Risks, benefits of treatment plan have been discussed.  4.  All questions have been answered.  5   will notify Dr. Mcginnis of admission.      William Saab DO  2024  14:11 EST    Electronically signed by William Saab DO at 24 1418       Facility-Administered Medications as of 1/15/2024   Medication Dose Route Frequency Provider Last Rate Last Admin    acetaminophen (TYLENOL) tablet 650 mg  650 mg Oral Q4H PRN William Saab DO        [COMPLETED] betamethasone acetate-betamethasone sodium phosphate (CELESTONE SOLUSPAN)  injection 12 mg  12 mg Intramuscular Q24H William Saab DO   12 mg at 01/13/24 1527    bisacodyl (DULCOLAX) suppository 10 mg  10 mg Rectal Daily PRN William Saab DO        calcium carbonate (TUMS) chewable tablet 500 mg (200 mg elemental)  1 tablet Oral Daily PRN William Saab DO   1 tablet at 01/13/24 2303    calcium carbonate (TUMS) chewable tablet 500 mg (200 mg elemental)  2 tablet Oral TID PRN Swapna Packer MD        [COMPLETED] ceFAZolin 2000 mg IVPB in 100 mL NS (MBP)  2,000 mg Intravenous Once William Saab DO   2,000 mg at 01/12/24 1505    Followed by    [COMPLETED] ceFAZolin 1000 mg IVPB in 100 mL NS (MBP)  1,000 mg Intravenous Q8H William Saab DO   1,000 mg at 01/14/24 1358    docusate sodium (COLACE) capsule 100 mg  100 mg Oral BID PRN William Saab DO        [COMPLETED] indomethacin (INDOCIN) capsule 50 mg  50 mg Oral Once William Saab DO   50 mg at 01/12/24 1558    Followed by    [COMPLETED] indomethacin (INDOCIN) capsule 25 mg  25 mg Oral Q4H William Saab DO   25 mg at 01/14/24 1549    lactated ringers bolus 1,000 mL  1,000 mL Intravenous PRN William Saab DO 4,000 mL/hr at 01/12/24 1455 1,000 mL at 01/12/24 1455    lactated ringers infusion  75 mL/hr Intravenous Continuous , Duarte BERNLA MD   Stopped at 01/14/24 1500    lidocaine PF 1% (XYLOCAINE) injection 0.5 mL  0.5 mL Intradermal Once PRN William Saab DO        [COMPLETED] magnesium sulfate bolus from bag 0.04 g/mL solution 6 g  6 g Intravenous Once William Saab DO   6 g at 01/12/24 1504    ondansetron ODT (ZOFRAN-ODT) disintegrating tablet 8 mg  8 mg Oral Q8H PRN William Saab DO        Or    ondansetron (ZOFRAN) injection 4 mg  4 mg Intravenous Q8H PRN William Saab, DO   4 mg at 01/12/24 2043    sodium chloride 0.9 % flush 10 mL  10 mL Intravenous Q12H William Saab,    10 mL at 01/12/24 2100    sodium chloride 0.9 % flush 10 mL  10 mL Intravenous  PRN William Saab DO        sodium chloride 0.9 % infusion 40 mL  40 mL Intravenous PRN William Saab DO        [COMPLETED] terbutaline (BRETHINE) 1 MG/ML injection  - ADS Override Pull        1 mg at 24 1600     Imaging Results (Last 72 Hours)       Procedure Component Value Units Date/Time    [facility]  Diagnostic Center [[id number]] Resulted: 01/15/24 0720     Updated: 01/15/24 0720          Orders (last 72 hrs)        Start     Ordered    01/15/24 0700  [facility]  Diagnostic Center  1 Time Imaging         24 1340    01/15/24 0600  Type & Screen  Morning Draw         24 1714    01/15/24 0600  CBC & Differential  Morning Draw         24 1714    01/15/24 0600  CBC Auto Differential  PROCEDURE ONCE         24 2202    24 1755  calcium carbonate (TUMS) chewable tablet 500 mg (200 mg elemental)  3 Times Daily PRN         24 1755    24 2200  ceFAZolin 1000 mg IVPB in 100 mL NS (MBP)  Every 8 Hours        See Hyperspace for full Linked Orders Report.    24 1411    24 2100  sodium chloride 0.9 % flush 10 mL  Every 12 Hours Scheduled         24 1411    24 2045  lactated ringers infusion  Continuous         24 1959    24 1947  indomethacin (INDOCIN) capsule 25 mg  Every 4 Hours        See Hyperspace for full Linked Orders Report.    24 1547    24 1702  Urinalysis, Microscopic Only - Urine, Clean Catch  Once         24 1701    24 1702  Urine Culture - Urine, Urine, Clean Catch  Once         24 1701    24 1645  indomethacin (INDOCIN) capsule 50 mg  Once        See Hyperspace for full Linked Orders Report.    24 1547    24 1600  Vital Signs q 4 while awake  Every 4 Hours      Comments: While the patient is awake.    24 1411    24 1556  terbutaline (BRETHINE) 1 MG/ML injection  - ADS Override Pull        Note to Pharmacy: Created by cabinet override    24  1556    24 1548  Place Sequential Compression Device  Once         24 1547    24 1548  Maintain Sequential Compression Device  Continuous         24 1547    24 1515  magnesium sulfate bolus from bag 0.04 g/mL solution 6 g  Once         24 1416    24 1508  ABO RH Specimen Verification  STAT         24 1507    24 1500  ceFAZolin 2000 mg IVPB in 100 mL NS (MBP)  Once        See Hyperspace for full Linked Orders Report.    24 1411    24 1500  magnesium sulfate bolus from bag 0.04 g/mL solution 4 g  Once,   Status:  Discontinued         24 1411    24 1500  magnesium sulfate 20 GM/500ML infusion  Continuous,   Status:  Discontinued         24 1411    24 1500  betamethasone acetate-betamethasone sodium phosphate (CELESTONE SOLUSPAN) injection 12 mg  Every 24 Hours         24 1411    24 1420  CBC & Differential  STAT         24 1419    24 1420  CBC Auto Differential  PROCEDURE ONCE         24 1419    24 1417  Urinalysis With Culture If Indicated - Urine, Clean Catch  Once         24 1417    24 1412  Preeclampsia Panel  STAT         24 1411    24 1411  Group B Streptococcus Culture - Swab, Vagina  Once        Comments: Per rectum vagina      24 1411    24 1410  Initiate Group Beta Strep (GBS) Prophylaxis Protocol, If Criteria Met  Continuous        Comments: NO TREATMENT RECOMMENDED IF: 1)  Maternal GBS status known negative 2)  Scheduled  birth with intact membranes, not in labor.  3 ) Maternal GBS unknown, no risk factors.   TREAT WITH ANTIBIOTICS IF:  1)  Maternal GBS status is known postive.  2)  Maternal GBS status unknown with these risk factors:  a)  Previous infant affected by GBS infection.  b)  GBS urinary tract infection (UTI) or bacteruria during pregnancy  c)  Unexplained maternal fever in labor (greater than or equal to 100.4F or 38.0C)  d)   Prolonged rupture of the membranes greater than or equal to 18 hours.  e)  Gestational age less than 37 weeks.    01/12/24 1411    01/12/24 1410  NST Moderate/High risk >24 weeks:   NST (One Hour) 3 Times Daily and PRN (Antepartum)  Per Order Details        Comments: For Antepartum Patients Greater Than 24 Weeks - NST Daily & Monitor Fetal Heart Tones For One Hour 3 Times Daily & PRN.    01/12/24 1411    01/12/24 1410  Diet: Regular/House Diet; Texture: Regular Texture (IDDSI 7); Fluid Consistency: Thin (IDDSI 0)  Diet Effective Now         01/12/24 1411    01/12/24 1409  Admit To Obstetrics Inpatient  Once         01/12/24 1411    01/12/24 1409  Code Status and Medical Interventions:  Continuous         01/12/24 1411    01/12/24 1409  Place Sequential Compression Device  Once         01/12/24 1411    01/12/24 1409  Maintain Sequential Compression Device  Continuous         01/12/24 1411    01/12/24 1409  Vital Signs Per hospital policy  Per Hospital Policy         01/12/24 1411    01/12/24 1409  Notify Physician (specified)  Until Discontinued         01/12/24 1411    01/12/24 1409  Notify physician for hyperstimulus (per hospital algorithm)  Until Discontinued         01/12/24 1411    01/12/24 1409  Notify physician if membranes ruptured, bleeding greater than 1 pad an hour, fetal heart tone abnormality, and severe pain  Until Discontinued         01/12/24 1411    01/12/24 1409  CBC (No Diff)  Once,   Status:  Canceled         01/12/24 1411    01/12/24 1409  Type & Screen  Once         01/12/24 1411    01/12/24 1409  Insert Peripheral IV  Once         01/12/24 1411    01/12/24 1409  Saline Lock & Maintain IV Access  Continuous         01/12/24 1411    01/12/24 1408  sodium chloride 0.9 % flush 10 mL  As Needed         01/12/24 1411    01/12/24 1408  sodium chloride 0.9 % infusion 40 mL  As Needed         01/12/24 1411    01/12/24 1408  lidocaine PF 1% (XYLOCAINE) injection 0.5 mL  Once As Needed         01/12/24  1411    24 1408  lactated ringers bolus 1,000 mL  As Needed         24 1411    24 1408  acetaminophen (TYLENOL) tablet 650 mg  Every 4 Hours PRN         24 1411    24 1408  calcium carbonate (TUMS) chewable tablet 500 mg (200 mg elemental)  Daily PRN         24 1411    24 1408  ondansetron ODT (ZOFRAN-ODT) disintegrating tablet 8 mg  Every 8 Hours PRN        See Hyperspace for full Linked Orders Report.    24 1411    24 1408  ondansetron (ZOFRAN) injection 4 mg  Every 8 Hours PRN        See Hyperspace for full Linked Orders Report.    24 1411    24 1408  docusate sodium (COLACE) capsule 100 mg  2 Times Daily PRN         24 1411    24 1408  bisacodyl (DULCOLAX) suppository 10 mg  Daily PRN         24 1411    Unscheduled  Position Change - For Intra-Uterine Resusitation for Hypertonus, HyperStimulation or Non-Reassuring Fetal Status  As Needed       24 1411                     Physician Progress Notes (last 72 hours)        Swapna Packer MD at 24 1710           William Lees  : 1995  MRN: [id number]  CSN: [id number]    Antepartum Progress Note  HD#3  CC:  hospital follow of threatened  labor    Subjective   She is eating now.   She is not having rhythmic cramping.   She has intermittent pressure and occasional spotting.   Good FM.    Objective     Min/max vitals past 24 hours:   Temp  Min: 97.7 °F (36.5 °C)  Max: 98.6 °F (37 °C)  BP  Min: 90/51  Max: 139/69  Pulse  Min: 56  Max: 89  Resp  Min: 14  Max: 18         General: well developed; well nourished  no acute distress   Heart: Not performed.   Lungs: breathing is unlabored   Abdomen: soft, non-tender; no masses  no umbilical or inguinal hernias are present  no hepato-splenomegaly   FHT's: reassuring and category 1   Cervix: was not checked.   Contractions: None to irregular             CBC:   Lab Results   Component Value  "Date    WBC 12.29 (H) 2024    HGB 12.3 2024    HCT 35.4 2024     2024     Pre-eclampsia Panel:   Lab Results   Component Value Date    ALKPHOS 98 2024    AST 15 2024    ALT 10 2024    BILITOT 0.2 2024     2024    URICACID 2.7 2024    CREATININE 0.58 2024       Assessment   IUP at 30w5d  Threatened  labor with advanced cervical dilation   Fetal status reassuring     Plan   She has completed 48 hours of magnesium and indocin. She is through her steroid window. Abx discontinued.   Okay to go to [facility] just understanding she may have to be moved back to [facility].   All questions were answered to the best of my ability.     Swapna Packer MD  2024  17:11 EST             Electronically signed by Swapna Packer MD at 24 1713       Jesus Ivey MD at 24 0558          Labourist:      Called to see Monica due to more frequent contractions that she is now feeling.  She denies vaginal leaking.  She is still having some dark red or brownish \"clumpy\" discharge.       She had bee 3/70/-2 when last checked yesterday.    I rechecked her and she is now 3/8-/0 to +1 with bulging membranes.      Given a concern for immanent labour she was transferred to L&D.   She remains cephalic.  Plan:  Continue ABX  Continue Magnesium  Continue Indomethacin.  Expectant management  Notify NICU      Electronically signed by Jesus Ivey MD at 24 0603       Swapna Packer MD at 24 1335          [facility]  Monica Lees  : 1995  MRN: [id number]  CSN: [id number]    Antepartum Progress Note  HD#2  CC: hospital follow of threatened  labor     Subjective   She has more constant cramping but no contractions this morning.   She is feeling fetal movements.   She denies LOF.   She has had not bright red bleeding. She notices occasional brown discharge.   She had some intermittent rectal pressure " this morning but none currently.     Objective     Min/max vitals past 24 hours:   Temp  Min: 97.8 °F (36.6 °C)  Max: 98.3 °F (36.8 °C)  BP  Min: 98/61  Max: 131/80  Pulse  Min: 75  Max: 121  Resp  Min: 16  Max: 16         General: well developed; well nourished  no acute distress   Heart: Not performed.   Lungs: breathing is unlabored   Abdomen: soft, non-tender; no masses  no umbilical or inguinal hernias are present  no hepato-splenomegaly   FHT's: reassuring and category 1   Cervix: was not checked.   Contractions: none            CBC w/ diff:   Lab Results   Component Value Date    WBC 12.29 (H) 2024    NEUTRORELPCT 71.3 2024    AUTOIGPER 0.5 2024    LYMPHORELPCT 20.7 2024    MONORELPCT 5.6 2024    EOSRELPCT 1.2 2024    BASORELPCT 0.7 2024    HGB 12.3 2024    HCT 35.4 2024    MCV 91.2 2024    RDW 12.6 2024     2024     Pre-eclampsia Panel:   Lab Results   Component Value Date    ALKPHOS 98 2024    AST 15 2024    ALT 10 2024    BILITOT 0.2 2024     2024    URICACID 2.7 2024    CREATININE 0.58 2024     UA:    Lab Results   Component Value Date    SQUAMEPIUA 3-6 (A) 2024    SPECGRAVUR 1.007 2024    KETONESU Negative 2024    BLOODU Moderate (2+) (A) 2024    LEUKOCYTESUR Negative 2024    NITRITEU Negative 2024    RBCUA 0-2 2024    WBCUA 0-2 2024    BACTERIA Trace 2024       Assessment   IUP at 30w4d  Threatened  labor with advanced cervical dilation   Fetal status reassuring     Plan   Continue 48 hours course of indocin. Continue magnesium sulfate through steroid window tomorrow afternoon.   Continue abx for GBS unknown (result pending)  Complete BMZ course today   Continue NST TID    Swapna Packer MD  2024  13:35 EST       Non Stress Test    LISSETTE Thomas  Patient: Monica Annia  Yoana  : 1995  MRN: [id number]  CSN: [id number]  Date: 2024    Estimated Date of Delivery: 3/19/24  Gestational Age: 30w4d    Indication for NST Threatened  labor   Total time > 20 minutes                    Interpretation    Baseline  beats per minute   Accelerations  Present   Decelerations Absent       Additional Comments Reactive and reassuring       Recommendations for f/u Continue TID NSTs       This note has been electronically signed.    Swapna Packer MD          Electronically signed by Swapna Packer MD at 24 1339       William Saab DO at 24 1548          LAborist    Chart reviewed.      Urinalysis normal limit  CBC and preeclampsia with normal limit  Fetal heart rate tracing   reactive with continued contractions 3 to 5 minutes apart.    PLAN  Increase magnesium sulfate to 3 g/h, add Indocin 48 hours.    Electronically signed by William Saab DO at 24 1549       FHR (last 3 days)       Date/Time Fetal HR Assessment Method Fetal HR (beats/min) Fetal HR Baseline Fetal HR Variability Fetal HR Accelerations Fetal HR Decelerations Fetal HR Tracing Category    24 2215 external 115 normal range moderate (amplitude range 6 to 25 bpm) greater than/equal to 15 bpm;lasting at least 15 seconds absent --    24 2200 external 120 normal range moderate (amplitude range 6 to 25 bpm) greater than/equal to 15 bpm;lasting at least 15 seconds absent --    24 2145 external 115 normal range moderate (amplitude range 6 to 25 bpm) greater than/equal to 15 bpm;lasting at least 15 seconds absent --    24 2130 external 110 normal range moderate (amplitude range 6 to 25 bpm) greater than/equal to 15 bpm;lasting at least 15 seconds absent --    24 1550 external 115 normal range moderate (amplitude range 6 to 25 bpm) greater than/equal to 15 bpm;lasting at least 15 seconds absent --    24 1500 -- 115 normal range moderate  (amplitude range 6 to 25 bpm) greater than/equal to 15 bpm;lasting at least 15 seconds absent --    01/14/24 1400 -- 110 normal range moderate (amplitude range 6 to 25 bpm) greater than/equal to 15 bpm;lasting at least 15 seconds absent --    01/14/24 1300 -- 110 normal range moderate (amplitude range 6 to 25 bpm) greater than/equal to 15 bpm;lasting at least 15 seconds absent --    01/14/24 1200 -- 110 normal range moderate (amplitude range 6 to 25 bpm) greater than/equal to 15 bpm;lasting at least 15 seconds absent --    01/14/24 1100 -- 110 normal range moderate (amplitude range 6 to 25 bpm) greater than/equal to 15 bpm;lasting at least 15 seconds absent --    01/14/24 1000 -- 110 normal range moderate (amplitude range 6 to 25 bpm) greater than/equal to 15 bpm;lasting at least 15 seconds absent --    01/14/24 0930 external 110 normal range moderate (amplitude range 6 to 25 bpm) greater than/equal to 15 bpm;lasting at least 15 seconds absent --    01/14/24 0900 external 115 normal range moderate (amplitude range 6 to 25 bpm) greater than/equal to 15 bpm;lasting at least 15 seconds absent --    01/14/24 0800 external 115 normal range moderate (amplitude range 6 to 25 bpm) greater than/equal to 15 bpm;lasting at least 15 seconds absent --    01/14/24 0730 external 115 normal range moderate (amplitude range 6 to 25 bpm) greater than/equal to 15 bpm;lasting at least 15 seconds absent --    01/14/24 0700 external 110 normal range moderate (amplitude range 6 to 25 bpm) greater than/equal to 15 bpm;lasting at least 15 seconds absent --    01/14/24 0630 external 110 normal range moderate (amplitude range 6 to 25 bpm) greater than/equal to 15 bpm;lasting at least 15 seconds absent --    01/14/24 0600 external 110 normal range moderate (amplitude range 6 to 25 bpm) greater than/equal to 15 bpm;lasting at least 15 seconds absent --    01/14/24 0530 external 115 normal range moderate (amplitude range 6 to 25 bpm) greater  than/equal to 15 bpm;lasting at least 15 seconds absent --    01/14/24 0500 external 115 normal range moderate (amplitude range 6 to 25 bpm) greater than/equal to 15 bpm;lasting at least 15 seconds absent --    01/14/24 0430 external 110 normal range moderate (amplitude range 6 to 25 bpm) greater than/equal to 15 bpm;lasting at least 15 seconds absent --    01/14/24 0400 external 110 normal range moderate (amplitude range 6 to 25 bpm) greater than/equal to 15 bpm;lasting at least 15 seconds absent --    01/14/24 0330 external 115 normal range moderate (amplitude range 6 to 25 bpm) greater than/equal to 15 bpm;lasting at least 15 seconds absent --    01/14/24 0300 external 120 normal range minimal (detectable, amplitude less than or equal to 5 bpm) greater than/equal to 15 bpm;lasting at least 15 seconds absent --    01/14/24 0230 external 115 normal range moderate (amplitude range 6 to 25 bpm) greater than/equal to 15 bpm;lasting at least 15 seconds absent --    01/14/24 0100 external 115 normal range moderate (amplitude range 6 to 25 bpm) greater than/equal to 15 bpm;lasting at least 15 seconds absent --    01/14/24 0030 external 110 normal range moderate (amplitude range 6 to 25 bpm) greater than/equal to 15 bpm;lasting at least 15 seconds absent --    01/14/24 0000 external 110 normal range moderate (amplitude range 6 to 25 bpm) greater than/equal to 15 bpm;lasting at least 15 seconds absent --    01/13/24 2330 external 105 bradycardia moderate (amplitude range 6 to 25 bpm) greater than/equal to 15 bpm;lasting at least 15 seconds absent --    01/13/24 2300 external 110 normal range moderate (amplitude range 6 to 25 bpm) greater than/equal to 15 bpm;lasting at least 15 seconds absent --    01/13/24 2245 external 110 normal range moderate (amplitude range 6 to 25 bpm) greater than/equal to 15 bpm;lasting at least 15 seconds absent --    01/13/24 2230 external 110 normal range moderate (amplitude range 6 to 25 bpm)  greater than/equal to 15 bpm;lasting at least 15 seconds absent Category I    01/13/24 1834 external 125 normal range moderate (amplitude range 6 to 25 bpm) greater than/equal to 10 bpm (32 wks gest or less);lasts at least 10 seconds (32 wks gest or less) absent --    01/13/24 1800 external 125 normal range moderate (amplitude range 6 to 25 bpm) greater than/equal to 10 bpm (32 wks gest or less);lasts at least 10 seconds (32 wks gest or less) absent --    01/13/24 1056 external 125 normal range moderate (amplitude range 6 to 25 bpm) greater than/equal to 10 bpm (32 wks gest or less);lasts at least 10 seconds (32 wks gest or less) absent --    01/12/24 2145 external 110 normal range moderate (amplitude range 6 to 25 bpm) greater than/equal to 10 bpm (32 wks gest or less);lasts at least 10 seconds (32 wks gest or less) absent --    01/12/24 2130 -- 110 normal range moderate (amplitude range 6 to 25 bpm) greater than/equal to 10 bpm (32 wks gest or less);lasts at least 10 seconds (32 wks gest or less) absent --    01/12/24 1730 external 130 normal range moderate (amplitude range 6 to 25 bpm) greater than/equal to 10 bpm (32 wks gest or less);lasts at least 10 seconds (32 wks gest or less) absent --    01/12/24 1715 external 130 normal range moderate (amplitude range 6 to 25 bpm) lasts at least 10 seconds (32 wks gest or less);greater than/equal to 10 bpm (32 wks gest or less) absent --    01/12/24 1700 external 130 normal range moderate (amplitude range 6 to 25 bpm) absent absent --    01/12/24 1645 external 130 normal range moderate (amplitude range 6 to 25 bpm) greater than/equal to 10 bpm (32 wks gest or less);lasts at least 10 seconds (32 wks gest or less) absent --    01/12/24 1630 external 125 normal range moderate (amplitude range 6 to 25 bpm) greater than/equal to 10 bpm (32 wks gest or less);lasts at least 10 seconds (32 wks gest or less) absent --    01/12/24 1615 external 125 normal range moderate (amplitude  range 6 to 25 bpm) greater than/equal to 10 bpm (32 wks gest or less);lasts at least 10 seconds (32 wks gest or less) absent --    01/12/24 1600 external 135  poor tracing -- -- -- -- --    01/12/24 1545 external 130 normal range moderate (amplitude range 6 to 25 bpm) greater than/equal to 10 bpm (32 wks gest or less);lasts at least 10 seconds (32 wks gest or less) absent --    01/12/24 1530 external 125 normal range moderate (amplitude range 6 to 25 bpm) greater than/equal to 10 bpm (32 wks gest or less);lasts at least 10 seconds (32 wks gest or less) absent --    01/12/24 1515 external 130 normal range moderate (amplitude range 6 to 25 bpm) greater than/equal to 10 bpm (32 wks gest or less);lasts at least 10 seconds (32 wks gest or less) absent --    01/12/24 1500 external 130 normal range moderate (amplitude range 6 to 25 bpm) greater than/equal to 10 bpm (32 wks gest or less);lasts at least 10 seconds (32 wks gest or less) absent --    01/12/24 1445 external 130 normal range moderate (amplitude range 6 to 25 bpm) greater than/equal to 15 bpm;lasting at least 15 seconds absent --    01/12/24 1430 external 130 normal range moderate (amplitude range 6 to 25 bpm) greater than/equal to 10 bpm (32 wks gest or less);lasts at least 10 seconds (32 wks gest or less) absent --    01/12/24 1415 external 130 normal range moderate (amplitude range 6 to 25 bpm) greater than/equal to 10 bpm (32 wks gest or less);lasts at least 10 seconds (32 wks gest or less) absent --          Uterine Activity (last 3 days)       Date/Time Method Contraction Frequency (Minutes) Contraction Duration (sec) Contraction Intensity Uterine Resting Tone Contraction Pattern    01/14/24 2215 external tocotransducer x1 100 -- -- --    01/14/24 2200 external tocotransducer -- -- -- -- --    01/14/24 2145 external tocotransducer -- -- -- -- --    01/14/24 2130 external tocotransducer x1 140 -- -- --    01/14/24 1550 external tocotransducer x2 80 -- -- --     01/14/24 1400 -- -- -- no contractions -- --    01/14/24 1300 -- x3  -- -- --    01/14/24 1156 -- -- -- -- soft by palpation --    01/14/24 1100 -- -- -- no contractions soft by palpation --    01/14/24 1000 -- -- -- no contractions -- --    01/14/24 0930 external tocotransducer -- -- -- -- --    01/14/24 0920 -- -- -- -- --  [place] adjusted- patient states she isnt having ctx --    01/14/24 0900 external tocotransducer 1-5 60-90 -- -- --    01/14/24 0800 external tocotransducer 1-5  -- -- --    01/14/24 0730 external tocotransducer 1-9  -- soft by palpation --    01/14/24 0703 -- -- -- -- soft by palpation --    01/14/24 0700 external tocotransducer Poor tracing. TOCO adjusted. -- -- -- --    01/14/24 0634 -- -- -- -- soft by palpation --    01/14/24 0630 external tocotransducer Poor tracing. TOCO adjusted. -- -- -- --    01/14/24 0600 external tocotransducer Poor tracing. TOCO adjusted. -- -- -- --    01/14/24 0530 external tocotransducer Poor tracing. TOCO adjusted. -- -- soft by palpation --    01/14/24 0500 external tocotransducer Poor tracing. TOCO adjusted. -- -- -- --    01/14/24 0432 -- -- -- -- soft by palpation --    01/14/24 0430 external tocotransducer Poor tracing. TOCO adjusted. -- -- -- --    01/14/24 0400 external tocotransducer Poor tracing. TOCO adjusted. -- -- -- --    01/14/24 0333 -- -- -- -- soft by palpation --    01/14/24 0330 external tocotransducer Poor tracing. TOCO adjusted. -- -- -- --    01/14/24 0306 -- -- -- -- soft by palpation --    01/14/24 0300 external tocotransducer Poor tracing. -- -- -- --    01/14/24 0250 -- -- -- -- soft by palpation --    01/14/24 0230 external tocotransducer 2-4  -- -- --    01/14/24 0100 external tocotransducer -- -- -- -- --    01/14/24 0030 external tocotransducer -- -- -- -- --    01/14/24 0000 external tocotransducer -- -- -- -- --    01/13/24 2330 external tocotransducer -- -- -- -- --    01/13/24 2300 external  tocotransducer -- -- -- -- --    01/13/24 2245 external tocotransducer -- -- -- -- --    01/13/24 2230 external tocotransducer --  pt states feeling period cramps, states it's not intermittant and it's not uncomfortable. Belly palpates mild tightening, non-tender. -- -- -- --    01/13/24 1800 external tocotransducer -- -- no contractions soft by palpation --    01/12/24 2145 external tocotransducer -- -- no contractions -- --    01/12/24 2130 external tocotransducer -- -- no contractions soft by palpation --    01/12/24 2100 per patient report 1  Pt reported 1 ctx that was mild in pain -- mild by palpation -- --    01/12/24 1730 external tocotransducer -- -- -- -- --    01/12/24 1715 external tocotransducer -- -- -- -- --    01/12/24 1700 external tocotransducer -- -- -- -- --    01/12/24 1645 external tocotransducer 5-6  -- -- --    01/12/24 1630 external tocotransducer -- -- -- -- --    01/12/24 1615 external tocotransducer x2 traced  -- -- --    01/12/24 1600 external tocotransducer 2-4  -- -- --    01/12/24 1545 external tocotransducer 2-3  -- -- --    01/12/24 1530 external tocotransducer x2 traced 100 -- -- --    01/12/24 1515 external tocotransducer -- -- -- -- --    01/12/24 1500 external tocotransducer 2-4  -- -- --    01/12/24 1445 external tocotransducer 2  -- -- --    01/12/24 1430 external tocotransducer 2-4  -- -- --    01/12/24 1415 external tocotransducer;palpation 2-3 100-120 -- soft by palpation --

## 2024-01-16 PROCEDURE — 59025 FETAL NON-STRESS TEST: CPT

## 2024-01-16 PROCEDURE — 59025 FETAL NON-STRESS TEST: CPT | Performed by: OBSTETRICS & GYNECOLOGY

## 2024-01-16 PROCEDURE — 99232 SBSQ HOSP IP/OBS MODERATE 35: CPT | Performed by: OBSTETRICS & GYNECOLOGY

## 2024-01-16 RX ADMIN — Medication 10 ML: at 08:09

## 2024-01-16 NOTE — NURSING NOTE
Patient reporting active fetal movement. Minimal cramping, no bleeding, spotting, or leaking of fluids.

## 2024-01-16 NOTE — PLAN OF CARE
Problem: Adult Inpatient Plan of Care  Goal: Plan of Care Review  Outcome: Ongoing, Progressing  Flowsheets (Taken 2024)  Progress: improving  Plan of Care Reviewed With: patient  Goal: Patient-Specific Goal (Individualized)  Outcome: Ongoing, Progressing  Flowsheets (Taken 2024)  Patient-Specific Goals (Include Timeframe): pt goal is to remain contraction free throughout the shift  Individualized Care Needs: education provided, care clustered  Anxieties, Fears or Concerns:  labor  Goal: Absence of Hospital-Acquired Illness or Injury  Outcome: Ongoing, Progressing  Intervention: Identify and Manage Fall Risk  Recent Flowsheet Documentation  Taken 2024 by Jeanie Wayne RN  Safety Promotion/Fall Prevention:   assistive device/personal items within reach   clutter free environment maintained   nonskid shoes/slippers when out of bed   room organization consistent   safety round/check completed  Intervention: Prevent Skin Injury  Recent Flowsheet Documentation  Taken 2024 by Jeanie Wayne RN  Body Position: sitting up in bed  Intervention: Prevent and Manage VTE (Venous Thromboembolism) Risk  Recent Flowsheet Documentation  Taken 2024 by Jeanie Wayne RN  Activity Management: up ad leigha  VTE Prevention/Management:   bilateral   sequential compression devices on  Intervention: Prevent Infection  Recent Flowsheet Documentation  Taken 2024 by Jeanie Wayne RN  Infection Prevention:   equipment surfaces disinfected   hand hygiene promoted   personal protective equipment utilized   rest/sleep promoted   single patient room provided   visitors restricted/screened  Goal: Optimal Comfort and Wellbeing  Outcome: Ongoing, Progressing  Intervention: Provide Person-Centered Care  Recent Flowsheet Documentation  Taken 2024 by Jeanie Wayne RN  Trust Relationship/Rapport:   care explained   choices provided   questions answered   questions  encouraged   reassurance provided   thoughts/feelings acknowledged  Goal: Readiness for Transition of Care  Outcome: Ongoing, Progressing     Problem:  Labor  Goal: Delayed  Delivery  Outcome: Ongoing, Progressing  Intervention: Monitor and Manage  Labor  Recent Flowsheet Documentation  Taken 2024 08 by Jeanie Wayne RN  Body Position: sitting up in bed  Intervention: Manage Tocolytic Therapy Administration  Recent Flowsheet Documentation  Taken 2024 0809 by Jeanie Wayne, RN  Medication Review/Management: medications reviewed   Goal Outcome Evaluation:  Plan of Care Reviewed With: patient        Progress: improving

## 2024-01-16 NOTE — PROGRESS NOTES
2024  HD:4  28 y.o. female  at 31w0d    Subjective   Monica feels well. Denies contractions, VB. Reports good FM.        Objective   Temp: Temp:  [97.7 °F (36.5 °C)-99 °F (37.2 °C)] 97.7 °F (36.5 °C) Temp src: Oral   BP: BP: (107-120)/(62-71) 120/66        Pulse: Heart Rate:  [57-61] 57  RR: Resp:  [14-16] 14    General:  nad   Abdomen: Gravid, nontender         Lab Results   Component Value Date    WBC 12.07 (H) 01/15/2024    HGB 10.8 (L) 01/15/2024    HCT 31.7 (L) 01/15/2024    MCV 95.2 01/15/2024     01/15/2024    HEPBSAG Negative 2023     Results from last 7 days   Lab Units 24  1444   AST (SGOT) U/L 15     Results from last 7 days   Lab Units 24  1444   ALT (SGPT) U/L 10      Results from last 7 days   Lab Units 24  1444   CREATININE mg/dL 0.58      Results from last 7 days   Lab Units 24  1444   BILIRUBIN mg/dL 0.2     Non Stress Test: minutes 20 min, Reactive with occasional contractions detected, but not felt by patient.   Indication: ALINE     Assessment  1.   28 y.o. yo female  at 31w0d  2. ALINE 3/8. Stable now s/p steroids and off magnesium. Tonight will be 48 hours off mag.  PDC US  yesterday normal growth, vtx,.     Plan  Cont current hospital care.       This note has been electronically signed.    Mary Garcia MD  2024

## 2024-01-17 PROCEDURE — 99232 SBSQ HOSP IP/OBS MODERATE 35: CPT | Performed by: OBSTETRICS & GYNECOLOGY

## 2024-01-17 PROCEDURE — 59025 FETAL NON-STRESS TEST: CPT | Performed by: OBSTETRICS & GYNECOLOGY

## 2024-01-17 PROCEDURE — 59025 FETAL NON-STRESS TEST: CPT

## 2024-01-17 RX ADMIN — Medication 10 ML: at 08:16

## 2024-01-17 NOTE — PROGRESS NOTES
2024  HD:5  28 y.o. female  at 31w1d    Subjective   Monica feels well. The cramping from last night reslved and she has felt well today. She also had some pink spotting yeterday, but none since.   Good FM.        Objective   Temp: Temp:  [98 °F (36.7 °C)-98.3 °F (36.8 °C)] 98.1 °F (36.7 °C) Temp src: Oral   BP: BP: (106-121)/(65-78) 112/66        Pulse: Heart Rate:  [62-81] 64  RR: Resp:  [16-18] 16    General:  nad   Abdomen: Gravid, nontender         Lab Results   Component Value Date    WBC 12.07 (H) 01/15/2024    HGB 10.8 (L) 01/15/2024    HCT 31.7 (L) 01/15/2024    MCV 95.2 01/15/2024     01/15/2024    HEPBSAG Negative 2023     Results from last 7 days   Lab Units 24  1444   AST (SGOT) U/L 15     Results from last 7 days   Lab Units 24  1444   ALT (SGPT) U/L 10      Results from last 7 days   Lab Units 24  1444   CREATININE mg/dL 0.58      Results from last 7 days   Lab Units 24  1444   BILIRUBIN mg/dL 0.2     Non Stress Test: minutes 20  non-stress test: NST: Reactive  indication: Questionable Labor     Assessment  1.   28 y.o. yo female  at 31w1d  2. ALINE- s/p steroids. Currently stable. Vtx.     Plan  Cont current hospital care.   2. Discussed possibility of going home on bedrest in next couple days if remains stable.     This note has been electronically signed.    Mary Garcia MD  2024

## 2024-01-17 NOTE — NURSING NOTE
Spent some time with the patient at bedside. Patient reports no further pink discharge. Rates contraction on tracing as 1-2, not uncomfortable but aware of their presence. Advised the patient to immediately report worsening contractions, return of spotting or bright red bleeding, if her water breaks, or any other complaints.

## 2024-01-18 ENCOUNTER — ANESTHESIA EVENT (OUTPATIENT)
Dept: LABOR AND DELIVERY | Facility: HOSPITAL | Age: 29
End: 2024-01-18
Payer: COMMERCIAL

## 2024-01-18 ENCOUNTER — ANESTHESIA (OUTPATIENT)
Dept: LABOR AND DELIVERY | Facility: HOSPITAL | Age: 29
End: 2024-01-18
Payer: COMMERCIAL

## 2024-01-18 LAB
ABO GROUP BLD: NORMAL
ATMOSPHERIC PRESS: ABNORMAL MM[HG]
ATMOSPHERIC PRESS: ABNORMAL MM[HG]
BASE EXCESS BLDCOA CALC-SCNC: -2.5 MMOL/L (ref 0–2)
BASE EXCESS BLDCOV CALC-SCNC: -1.6 MMOL/L (ref 0–2)
BDY SITE: ABNORMAL
BDY SITE: ABNORMAL
BLD GP AB SCN SERPL QL: NEGATIVE
BODY TEMPERATURE: 37
BODY TEMPERATURE: 37
CO2 BLDA-SCNC: 24.2 MMOL/L (ref 22–33)
CO2 BLDA-SCNC: 26 MMOL/L (ref 22–33)
DEPRECATED RDW RBC AUTO: 43.6 FL (ref 37–54)
EPAP: 0
EPAP: 0
ERYTHROCYTE [DISTWIDTH] IN BLOOD BY AUTOMATED COUNT: 12.8 % (ref 12.3–15.4)
HCO3 BLDCOA-SCNC: 24.5 MMOL/L (ref 16.9–20.5)
HCO3 BLDCOV-SCNC: 23.1 MMOL/L (ref 18.6–21.4)
HCT VFR BLD AUTO: 40.2 % (ref 34–46.6)
HGB BLD-MCNC: 13.9 G/DL (ref 12–15.9)
HGB BLDA-MCNC: 14.4 G/DL (ref 14–18)
HGB BLDA-MCNC: 14.6 G/DL (ref 14–18)
INHALED O2 CONCENTRATION: 21 %
INHALED O2 CONCENTRATION: 21 %
IPAP: 0
IPAP: 0
MCH RBC QN AUTO: 32.4 PG (ref 26.6–33)
MCHC RBC AUTO-ENTMCNC: 34.6 G/DL (ref 31.5–35.7)
MCV RBC AUTO: 93.7 FL (ref 79–97)
MODALITY: ABNORMAL
MODALITY: ABNORMAL
PAW @ PEAK INSP FLOW SETTING VENT: 0 CMH2O
PAW @ PEAK INSP FLOW SETTING VENT: 0 CMH2O
PCO2 BLDCOA: 49.5 MMHG (ref 43.3–54.9)
PCO2 BLDCOV: 38.1 MM HG (ref 28–40)
PH BLDCOA: 7.3 PH UNITS (ref 7.22–7.3)
PH BLDCOV: 7.39 PH UNITS (ref 7.31–7.37)
PLATELET # BLD AUTO: 195 10*3/MM3 (ref 140–450)
PMV BLD AUTO: 11.1 FL (ref 6–12)
PO2 BLDCOA: 25.5 MMHG (ref 11.5–43.3)
PO2 BLDCOV: 36.4 MM HG (ref 21–31)
RBC # BLD AUTO: 4.29 10*6/MM3 (ref 3.77–5.28)
RH BLD: POSITIVE
SAO2 % BLDCOA: 56.3 %
SAO2 % BLDCOA: ABNORMAL %
SAO2 % BLDCOV: 82.9 %
T PALLIDUM IGG SER QL: NORMAL
T&S EXPIRATION DATE: NORMAL
TOTAL RATE: 0 BREATHS/MINUTE
TOTAL RATE: 0 BREATHS/MINUTE
VENTILATOR MODE: ABNORMAL
VENTILATOR MODE: ABNORMAL
WBC NRBC COR # BLD AUTO: 14.49 10*3/MM3 (ref 3.4–10.8)

## 2024-01-18 PROCEDURE — 51703 INSERT BLADDER CATH COMPLEX: CPT

## 2024-01-18 PROCEDURE — 25810000003 LACTATED RINGERS PER 1000 ML: Performed by: OBSTETRICS & GYNECOLOGY

## 2024-01-18 PROCEDURE — 88307 TISSUE EXAM BY PATHOLOGIST: CPT | Performed by: OBSTETRICS & GYNECOLOGY

## 2024-01-18 PROCEDURE — 25010000002 ROPIVACAINE PER 1 MG: Performed by: NURSE ANESTHETIST, CERTIFIED REGISTERED

## 2024-01-18 PROCEDURE — 86901 BLOOD TYPING SEROLOGIC RH(D): CPT | Performed by: OBSTETRICS & GYNECOLOGY

## 2024-01-18 PROCEDURE — 86780 TREPONEMA PALLIDUM: CPT | Performed by: OBSTETRICS & GYNECOLOGY

## 2024-01-18 PROCEDURE — 82805 BLOOD GASES W/O2 SATURATION: CPT

## 2024-01-18 PROCEDURE — 85027 COMPLETE CBC AUTOMATED: CPT | Performed by: OBSTETRICS & GYNECOLOGY

## 2024-01-18 PROCEDURE — 25010000002 FENTANYL CITRATE (PF) 50 MCG/ML SOLUTION: Performed by: NURSE ANESTHETIST, CERTIFIED REGISTERED

## 2024-01-18 PROCEDURE — 86850 RBC ANTIBODY SCREEN: CPT | Performed by: OBSTETRICS & GYNECOLOGY

## 2024-01-18 PROCEDURE — 25010000002 MAGNESIUM SULFATE 20 GM/500ML SOLUTION

## 2024-01-18 PROCEDURE — C1755 CATHETER, INTRASPINAL: HCPCS

## 2024-01-18 PROCEDURE — 59025 FETAL NON-STRESS TEST: CPT

## 2024-01-18 PROCEDURE — 59400 OBSTETRICAL CARE: CPT | Performed by: OBSTETRICS & GYNECOLOGY

## 2024-01-18 PROCEDURE — 86900 BLOOD TYPING SEROLOGIC ABO: CPT | Performed by: OBSTETRICS & GYNECOLOGY

## 2024-01-18 PROCEDURE — C1755 CATHETER, INTRASPINAL: HCPCS | Performed by: ANESTHESIOLOGY

## 2024-01-18 PROCEDURE — 25010000002 CEFAZOLIN PER 500 MG: Performed by: OBSTETRICS & GYNECOLOGY

## 2024-01-18 RX ORDER — OXYTOCIN/0.9 % SODIUM CHLORIDE 30/500 ML
250 PLASTIC BAG, INJECTION (ML) INTRAVENOUS CONTINUOUS
Status: DISPENSED | OUTPATIENT
Start: 2024-01-18 | End: 2024-01-18

## 2024-01-18 RX ORDER — BISACODYL 10 MG
10 SUPPOSITORY, RECTAL RECTAL DAILY PRN
Status: DISCONTINUED | OUTPATIENT
Start: 2024-01-19 | End: 2024-01-20 | Stop reason: HOSPADM

## 2024-01-18 RX ORDER — MISOPROSTOL 200 UG/1
800 TABLET ORAL AS NEEDED
Status: DISCONTINUED | OUTPATIENT
Start: 2024-01-18 | End: 2024-01-20 | Stop reason: HOSPADM

## 2024-01-18 RX ORDER — MISOPROSTOL 200 UG/1
800 TABLET ORAL AS NEEDED
Status: DISCONTINUED | OUTPATIENT
Start: 2024-01-18 | End: 2024-01-18 | Stop reason: HOSPADM

## 2024-01-18 RX ORDER — DOCUSATE SODIUM 100 MG/1
100 CAPSULE, LIQUID FILLED ORAL 2 TIMES DAILY
Status: DISCONTINUED | OUTPATIENT
Start: 2024-01-18 | End: 2024-01-20 | Stop reason: HOSPADM

## 2024-01-18 RX ORDER — OXYTOCIN/0.9 % SODIUM CHLORIDE 30/500 ML
999 PLASTIC BAG, INJECTION (ML) INTRAVENOUS ONCE
Status: DISCONTINUED | OUTPATIENT
Start: 2024-01-18 | End: 2024-01-18 | Stop reason: HOSPADM

## 2024-01-18 RX ORDER — LIDOCAINE HYDROCHLORIDE 10 MG/ML
0.5 INJECTION, SOLUTION EPIDURAL; INFILTRATION; INTRACAUDAL; PERINEURAL ONCE AS NEEDED
Status: DISCONTINUED | OUTPATIENT
Start: 2024-01-18 | End: 2024-01-18 | Stop reason: HOSPADM

## 2024-01-18 RX ORDER — SODIUM CHLORIDE 9 MG/ML
40 INJECTION, SOLUTION INTRAVENOUS AS NEEDED
Status: DISCONTINUED | OUTPATIENT
Start: 2024-01-18 | End: 2024-01-18 | Stop reason: HOSPADM

## 2024-01-18 RX ORDER — SODIUM CHLORIDE, SODIUM LACTATE, POTASSIUM CHLORIDE, CALCIUM CHLORIDE 600; 310; 30; 20 MG/100ML; MG/100ML; MG/100ML; MG/100ML
100 INJECTION, SOLUTION INTRAVENOUS CONTINUOUS
Status: DISCONTINUED | OUTPATIENT
Start: 2024-01-18 | End: 2024-01-18

## 2024-01-18 RX ORDER — SODIUM CHLORIDE 0.9 % (FLUSH) 0.9 %
10 SYRINGE (ML) INJECTION EVERY 12 HOURS SCHEDULED
Status: DISCONTINUED | OUTPATIENT
Start: 2024-01-18 | End: 2024-01-18 | Stop reason: HOSPADM

## 2024-01-18 RX ORDER — PROMETHAZINE HYDROCHLORIDE 12.5 MG/1
12.5 TABLET ORAL EVERY 4 HOURS PRN
Status: DISCONTINUED | OUTPATIENT
Start: 2024-01-18 | End: 2024-01-20 | Stop reason: HOSPADM

## 2024-01-18 RX ORDER — IBUPROFEN 600 MG/1
600 TABLET ORAL EVERY 6 HOURS PRN
Status: DISCONTINUED | OUTPATIENT
Start: 2024-01-18 | End: 2024-01-20 | Stop reason: HOSPADM

## 2024-01-18 RX ORDER — HYDROCODONE BITARTRATE AND ACETAMINOPHEN 5; 325 MG/1; MG/1
1 TABLET ORAL EVERY 4 HOURS PRN
Status: DISCONTINUED | OUTPATIENT
Start: 2024-01-18 | End: 2024-01-20 | Stop reason: HOSPADM

## 2024-01-18 RX ORDER — ONDANSETRON 4 MG/1
4 TABLET, ORALLY DISINTEGRATING ORAL EVERY 6 HOURS PRN
Status: DISCONTINUED | OUTPATIENT
Start: 2024-01-18 | End: 2024-01-20 | Stop reason: HOSPADM

## 2024-01-18 RX ORDER — MAGNESIUM CARB/ALUMINUM HYDROX 105-160MG
30 TABLET,CHEWABLE ORAL ONCE
Status: DISCONTINUED | OUTPATIENT
Start: 2024-01-18 | End: 2024-01-18 | Stop reason: HOSPADM

## 2024-01-18 RX ORDER — DIPHENHYDRAMINE HYDROCHLORIDE 50 MG/ML
12.5 INJECTION INTRAMUSCULAR; INTRAVENOUS EVERY 8 HOURS PRN
Status: DISCONTINUED | OUTPATIENT
Start: 2024-01-18 | End: 2024-01-18 | Stop reason: HOSPADM

## 2024-01-18 RX ORDER — MAGNESIUM SULFATE HEPTAHYDRATE 40 MG/ML
1 INJECTION, SOLUTION INTRAVENOUS CONTINUOUS
Status: DISCONTINUED | OUTPATIENT
Start: 2024-01-18 | End: 2024-01-18

## 2024-01-18 RX ORDER — LIDOCAINE HYDROCHLORIDE AND EPINEPHRINE 15; 5 MG/ML; UG/ML
INJECTION, SOLUTION EPIDURAL AS NEEDED
Status: DISCONTINUED | OUTPATIENT
Start: 2024-01-18 | End: 2024-01-18 | Stop reason: SURG

## 2024-01-18 RX ORDER — EPHEDRINE SULFATE 5 MG/ML
10 INJECTION INTRAVENOUS
Status: DISCONTINUED | OUTPATIENT
Start: 2024-01-18 | End: 2024-01-18 | Stop reason: HOSPADM

## 2024-01-18 RX ORDER — LIDOCAINE HCL/EPINEPHRINE/PF 2%-1:200K
VIAL (ML) INJECTION AS NEEDED
Status: DISCONTINUED | OUTPATIENT
Start: 2024-01-18 | End: 2024-01-18 | Stop reason: SURG

## 2024-01-18 RX ORDER — ONDANSETRON 2 MG/ML
4 INJECTION INTRAMUSCULAR; INTRAVENOUS ONCE AS NEEDED
Status: DISCONTINUED | OUTPATIENT
Start: 2024-01-18 | End: 2024-01-18 | Stop reason: HOSPADM

## 2024-01-18 RX ORDER — MAGNESIUM SULFATE 1 G/100ML
1 INJECTION INTRAVENOUS CONTINUOUS
Status: DISCONTINUED | OUTPATIENT
Start: 2024-01-18 | End: 2024-01-18

## 2024-01-18 RX ORDER — OXYTOCIN/0.9 % SODIUM CHLORIDE 30/500 ML
125 PLASTIC BAG, INJECTION (ML) INTRAVENOUS CONTINUOUS PRN
Status: DISCONTINUED | OUTPATIENT
Start: 2024-01-18 | End: 2024-01-20 | Stop reason: HOSPADM

## 2024-01-18 RX ORDER — FENTANYL CITRATE 50 UG/ML
INJECTION, SOLUTION INTRAMUSCULAR; INTRAVENOUS AS NEEDED
Status: DISCONTINUED | OUTPATIENT
Start: 2024-01-18 | End: 2024-01-18 | Stop reason: SURG

## 2024-01-18 RX ORDER — ROPIVACAINE HYDROCHLORIDE 2 MG/ML
15 INJECTION, SOLUTION EPIDURAL; INFILTRATION; PERINEURAL CONTINUOUS
Status: DISCONTINUED | OUTPATIENT
Start: 2024-01-18 | End: 2024-01-18

## 2024-01-18 RX ORDER — ONDANSETRON 2 MG/ML
4 INJECTION INTRAMUSCULAR; INTRAVENOUS EVERY 6 HOURS PRN
Status: DISCONTINUED | OUTPATIENT
Start: 2024-01-18 | End: 2024-01-18 | Stop reason: HOSPADM

## 2024-01-18 RX ORDER — HYDROCORTISONE 25 MG/G
1 CREAM TOPICAL AS NEEDED
Status: DISCONTINUED | OUTPATIENT
Start: 2024-01-18 | End: 2024-01-20 | Stop reason: HOSPADM

## 2024-01-18 RX ORDER — CITRIC ACID/SODIUM CITRATE 334-500MG
30 SOLUTION, ORAL ORAL ONCE
Status: DISCONTINUED | OUTPATIENT
Start: 2024-01-18 | End: 2024-01-18 | Stop reason: HOSPADM

## 2024-01-18 RX ORDER — CARBOPROST TROMETHAMINE 250 UG/ML
250 INJECTION, SOLUTION INTRAMUSCULAR AS NEEDED
Status: DISCONTINUED | OUTPATIENT
Start: 2024-01-18 | End: 2024-01-18 | Stop reason: HOSPADM

## 2024-01-18 RX ORDER — ACETAMINOPHEN 325 MG/1
650 TABLET ORAL EVERY 4 HOURS PRN
Status: DISCONTINUED | OUTPATIENT
Start: 2024-01-18 | End: 2024-01-18 | Stop reason: HOSPADM

## 2024-01-18 RX ORDER — ACETAMINOPHEN 325 MG/1
650 TABLET ORAL EVERY 6 HOURS PRN
Status: DISCONTINUED | OUTPATIENT
Start: 2024-01-18 | End: 2024-01-20 | Stop reason: HOSPADM

## 2024-01-18 RX ORDER — ONDANSETRON 4 MG/1
4 TABLET, ORALLY DISINTEGRATING ORAL EVERY 6 HOURS PRN
Status: DISCONTINUED | OUTPATIENT
Start: 2024-01-18 | End: 2024-01-18 | Stop reason: HOSPADM

## 2024-01-18 RX ORDER — SODIUM CHLORIDE 0.9 % (FLUSH) 0.9 %
1-10 SYRINGE (ML) INJECTION AS NEEDED
Status: DISCONTINUED | OUTPATIENT
Start: 2024-01-18 | End: 2024-01-19

## 2024-01-18 RX ORDER — MORPHINE SULFATE 2 MG/ML
2 INJECTION, SOLUTION INTRAMUSCULAR; INTRAVENOUS
Status: DISCONTINUED | OUTPATIENT
Start: 2024-01-18 | End: 2024-01-18 | Stop reason: HOSPADM

## 2024-01-18 RX ORDER — METOCLOPRAMIDE HYDROCHLORIDE 5 MG/ML
10 INJECTION INTRAMUSCULAR; INTRAVENOUS ONCE AS NEEDED
Status: DISCONTINUED | OUTPATIENT
Start: 2024-01-18 | End: 2024-01-18 | Stop reason: HOSPADM

## 2024-01-18 RX ORDER — ONDANSETRON 2 MG/ML
4 INJECTION INTRAMUSCULAR; INTRAVENOUS EVERY 6 HOURS PRN
Status: DISCONTINUED | OUTPATIENT
Start: 2024-01-18 | End: 2024-01-20 | Stop reason: HOSPADM

## 2024-01-18 RX ORDER — HYDROCODONE BITARTRATE AND ACETAMINOPHEN 10; 325 MG/1; MG/1
1 TABLET ORAL EVERY 4 HOURS PRN
Status: DISCONTINUED | OUTPATIENT
Start: 2024-01-18 | End: 2024-01-20 | Stop reason: HOSPADM

## 2024-01-18 RX ORDER — METHYLERGONOVINE MALEATE 0.2 MG/ML
200 INJECTION INTRAVENOUS ONCE AS NEEDED
Status: DISCONTINUED | OUTPATIENT
Start: 2024-01-18 | End: 2024-01-18 | Stop reason: HOSPADM

## 2024-01-18 RX ORDER — SODIUM CHLORIDE 0.9 % (FLUSH) 0.9 %
10 SYRINGE (ML) INJECTION AS NEEDED
Status: DISCONTINUED | OUTPATIENT
Start: 2024-01-18 | End: 2024-01-18 | Stop reason: HOSPADM

## 2024-01-18 RX ORDER — FAMOTIDINE 10 MG/ML
20 INJECTION, SOLUTION INTRAVENOUS ONCE AS NEEDED
Status: DISCONTINUED | OUTPATIENT
Start: 2024-01-18 | End: 2024-01-18 | Stop reason: HOSPADM

## 2024-01-18 RX ORDER — EPHEDRINE SULFATE 5 MG/ML
INJECTION INTRAVENOUS
Status: DISCONTINUED
Start: 2024-01-18 | End: 2024-01-18 | Stop reason: WASHOUT

## 2024-01-18 RX ADMIN — FENTANYL CITRATE 100 MCG: 50 INJECTION, SOLUTION INTRAMUSCULAR; INTRAVENOUS at 14:53

## 2024-01-18 RX ADMIN — ROPIVACAINE HYDROCHLORIDE 15 ML/HR: 2 INJECTION, SOLUTION EPIDURAL; INFILTRATION at 14:59

## 2024-01-18 RX ADMIN — ROPIVACAINE HYDROCHLORIDE 9 ML: 5 INJECTION, SOLUTION EPIDURAL; INFILTRATION; PERINEURAL at 14:55

## 2024-01-18 RX ADMIN — MAGNESIUM SULFATE IN WATER 1 G/HR: 20 INJECTION, SOLUTION INTRAVENOUS at 02:33

## 2024-01-18 RX ADMIN — SODIUM CHLORIDE, POTASSIUM CHLORIDE, SODIUM LACTATE AND CALCIUM CHLORIDE 100 ML/HR: 600; 310; 30; 20 INJECTION, SOLUTION INTRAVENOUS at 15:00

## 2024-01-18 RX ADMIN — Medication: at 19:00

## 2024-01-18 RX ADMIN — LIDOCAINE HYDROCHLORIDE AND EPINEPHRINE 3 ML: 15; 5 INJECTION, SOLUTION EPIDURAL at 14:51

## 2024-01-18 RX ADMIN — Medication 250 ML/HR: at 16:41

## 2024-01-18 RX ADMIN — SODIUM CHLORIDE 2000 MG: 900 INJECTION INTRAVENOUS at 02:31

## 2024-01-18 RX ADMIN — DOCUSATE SODIUM 100 MG: 100 CAPSULE, LIQUID FILLED ORAL at 20:06

## 2024-01-18 RX ADMIN — Medication 1 APPLICATION: at 19:00

## 2024-01-18 RX ADMIN — IBUPROFEN 600 MG: 600 TABLET, FILM COATED ORAL at 20:05

## 2024-01-18 RX ADMIN — LIDOCAINE HYDROCHLORIDE AND EPINEPHRINE 2 ML: 20; 5 INJECTION, SOLUTION EPIDURAL; INFILTRATION; INTRACAUDAL; PERINEURAL at 14:57

## 2024-01-18 RX ADMIN — SODIUM CHLORIDE, POTASSIUM CHLORIDE, SODIUM LACTATE AND CALCIUM CHLORIDE 100 ML/HR: 600; 310; 30; 20 INJECTION, SOLUTION INTRAVENOUS at 02:30

## 2024-01-18 RX ADMIN — WITCH HAZEL: 500 SOLUTION RECTAL; TOPICAL at 19:00

## 2024-01-18 RX ADMIN — CEFAZOLIN 1000 MG: 1 INJECTION, POWDER, FOR SOLUTION INTRAMUSCULAR; INTRAVENOUS; PARENTERAL at 09:39

## 2024-01-18 NOTE — PROGRESS NOTES
CE now 6-7/100, bulging bag. Discussed with hue and her , now delivery imminent. AROM performed, clear fluid. NICU aware.

## 2024-01-18 NOTE — PAYOR COMM NOTE
"Kiran Lees (28 y.o. Female)     Auth#[id number]   Continued stay review.    To: Maria L    From:Deidre Pace LPN, Utilization Review  Phone #[phone]  Fax #[phone]        Date of Birth   1995    Social Security Number       Address   [address] [place] [address]    Home Phone   [phone]    MRN   [id number]       Mandaen   None    Marital Status                               Admission Date   24    Admission Type   Elective    Admitting Provider   Mary Garcia MD    Attending Provider   Mary Garcia MD    Department, Room/Bed   [facility] LABOR DELIVERY, N306/       Discharge Date       Discharge Disposition       Discharge Destination                                 Attending Provider: Mary Garcia MD    Allergies: Amoxicillin, Penicillins    Isolation: None   Infection: None   Code Status: CPR    Ht: 162.6 cm (64\")   Wt: 60.8 kg (134 lb)    Admission Cmt: None   Principal Problem:  labor [O60.00]                   Active Insurance as of 2024       Primary Coverage       Payor Plan Insurance Group Employer/Plan Group    [employer] BLUE CROSS [employer] BLUE CROSS BLUE SHIELD PPO [id number]       Payor Plan Address Payor Plan Phone Number Payor Plan Fax Number Effective Dates    [address] [phone]  2023 - None Entered    [facility] [id number]         Subscriber Name Subscriber Birth Date Member ID       KIRAN LEES 1995 [id number]                     Emergency Contacts        (Rel.) Home Phone Work Phone Mobile Phone    KEELEY MELARA (Spouse) -- -- [phone]    JOSÉ LUISNEEL JUARES (Sister) -- -- [phone]              Vital Signs (last 2 days)       Date/Time Temp Temp src Pulse Resp BP Patient Position SpO2    24 0533 -- -- 73 16 119/72 Lying 97    24 0429 98.1 (36.7) Oral 66 16 105/56 Lying 98    24 0332 -- -- 60 14 98/62 Lying 96    24 0233 -- -- " 85 16 116/72 Sitting 99    01/18/24 0230 -- -- -- -- -- -- --    Comment rows:    OBSERV: Pt instructed to call out if ctx become more intense or if suspected ROM. Pt verbalized understanding. at 01/18/24 0230    01/18/24 0156 98.1 (36.7) Oral 93 18 124/70 Sitting --    01/18/24 0108 98 (36.7) Oral 75 16 121/67 Sitting --    01/17/24 2103 98 (36.7) Axillary 82 16 116/68 Sitting --    01/17/24 1523 98.1 (36.7) Oral 64 16 112/66 Sitting --    01/17/24 0751 98.3 (36.8) Oral 81 17 121/75 Sitting --    01/17/24 0400 -- -- -- -- -- -- --    Comment rows:    OBSERV: pateint sleeping comfortably at this time at 01/17/24 0400    01/16/24 2348 98.1 (36.7) Oral 62 16 118/78 Lying --    01/16/24 1945 98 (36.7) Oral 68 18 106/65 Sitting --    01/16/24 1552 98 (36.7) Oral 62 16 110/68 Sitting --    01/16/24 1204 98.2 (36.8) Oral 60 16 110/68 Sitting --    01/16/24 0809 -- -- -- -- -- -- --    Comment rows:    OBSERV: Pt denies contractions/cramping, vaginal bleeding, LOF at this time. at 01/16/24 0809    01/16/24 0802 97.7 (36.5) Oral 57 14 120/66 Sitting --    01/16/24 0005 -- -- -- -- -- -- --    Comment rows:    OBSERV: patient sleeping at this time at 01/16/24 0005          Current Facility-Administered Medications   Medication Dose Route Frequency Provider Last Rate Last Admin    acetaminophen (TYLENOL) tablet 650 mg  650 mg Oral Q4H PRN Duarte Whitfield MD        bisacodyl (DULCOLAX) suppository 10 mg  10 mg Rectal Daily PRN William Saab DO        calcium carbonate (TUMS) chewable tablet 500 mg (200 mg elemental)  2 tablet Oral TID PRN Swapna Packer MD        ceFAZolin 1000 mg IVPB in 100 mL NS (MBP)  1,000 mg Intravenous Q8H Duarte Whitfield MD        docusate sodium (COLACE) capsule 100 mg  100 mg Oral BID PRN William Saab DO        lactated ringers bolus 1,000 mL  1,000 mL Intravenous Once PRN Duarte Whitfield MD        lactated ringers infusion  100 mL/hr Intravenous Continuous Duarte Whitfield   mL/hr at 01/18/24 0230 100 mL/hr at 01/18/24 0230    lidocaine PF 1% (XYLOCAINE) injection 0.5 mL  0.5 mL Intradermal Once PRN High, Duarte BERNAL MD        magnesium sulfate 20 GM/500ML infusion  1 g/hr Intravenous Continuous Box, Leland ALFARO, [facility] 25 mL/hr at 01/18/24 0233 1 g/hr at 01/18/24 0233    mineral oil liquid 30 mL  30 mL Topical Once High, Duarte BERNAL MD        morphine injection 2 mg  2 mg Intravenous Q2H PRN High, Duarte BERNAL MD        morphine injection 4 mg  4 mg Intravenous Q2H PRN High, Duarte BERNAL MD        ondansetron ODT (ZOFRAN-ODT) disintegrating tablet 4 mg  4 mg Oral Q6H PRN High, Duarte BERNAL MD        Or    ondansetron (ZOFRAN) injection 4 mg  4 mg Intravenous Q6H PRN High, Duarte BERNAL MD        sodium chloride 0.9 % flush 10 mL  10 mL Intravenous Q12H William Saab, DO   10 mL at 01/17/24 0816    sodium chloride 0.9 % flush 10 mL  10 mL Intravenous Q12H High, Duarte BERNAL MD        sodium chloride 0.9 % flush 10 mL  10 mL Intravenous PRN High, Duarte BERNAL MD        sodium chloride 0.9 % infusion 40 mL  40 mL Intravenous PRN High, Duarte BERNAL MD         Lab Results (last 48 hours)       Procedure Component Value Units Date/Time    CBC (No Diff) [[id number]]  (Abnormal) Collected: 01/18/24 0219    Specimen: Blood Updated: 01/18/24 0252     WBC 14.49 10*3/mm3      RBC 4.29 10*6/mm3      Hemoglobin 13.9 g/dL      Hematocrit 40.2 %      MCV 93.7 fL      MCH 32.4 pg      MCHC 34.6 g/dL      RDW 12.8 %      RDW-SD 43.6 fl      MPV 11.1 fL      Platelets 195 10*3/mm3     T Pallidum Antibody w/ reflex RPR [[id number]] Collected: 01/18/24 0219    Specimen: Blood Updated: 01/18/24 0239          Imaging Results (Last 48 Hours)       ** No results found for the last 48 hours. **          Orders (last 48 hrs)        Start     Ordered    01/18/24 1000  ceFAZolin 1000 mg IVPB in 100 mL NS (MBP)  Every 8 Hours        See Hyperspace for full Linked Orders Report.    01/18/24 0153    01/18/24 0632  NPO Diet NPO Type: Ice Chips   Diet Effective Now         01/18/24 0631    01/18/24 0400  Vital Signs q 4 while awake  Every 4 Hours      Comments: While the patient is awake.    01/18/24 0153    01/18/24 0245  sodium chloride 0.9 % flush 10 mL  Every 12 Hours Scheduled         01/18/24 0153    01/18/24 0245  lactated ringers infusion  Continuous         01/18/24 0153    01/18/24 0245  mineral oil liquid 30 mL  Once         01/18/24 0153    01/18/24 0245  magnesium sulfate in D5W 1g/100mL (PREMIX)  Continuous,   Status:  Discontinued         01/18/24 0153    01/18/24 0245  magnesium sulfate 20 GM/500ML infusion  Continuous         01/18/24 0157    01/18/24 0230  ceFAZolin 2000 mg IVPB in 100 mL NS (MBP)  Once        See Hyperspace for full Linked Orders Report.    01/18/24 0153    01/18/24 0208  CBC (No Diff)  STAT         01/18/24 0208    01/18/24 0208  Type & Screen  STAT         01/18/24 0208    01/18/24 0208  T Pallidum Antibody w/ reflex RPR  Once         01/18/24 0208    01/18/24 0154  Code Status and Medical Interventions:  Continuous         01/18/24 0153    01/18/24 0154  Obtain Informed Consent  Once         01/18/24 0153    01/18/24 0154  Vital Signs Per Hospital Policy  Per Hospital Policy         01/18/24 0153    01/18/24 0154  Mini-Prep Prior to Delivery  Once         01/18/24 0153    01/18/24 0154  Continuous Fetal Monitoring With NST on Admission and Prior to Initiation of Oxytocin.  Per Order Details        Comments: Continuous Fetal Monitoring With NST on Admission & Prior to Initiation of Oxytocin.    01/18/24 0153    01/18/24 0154  External Uterine Contraction Monitoring  Per Hospital Policy         01/18/24 0153    01/18/24 0154  Notify Provider (Specified)  Until Discontinued         01/18/24 0153    01/18/24 0154  Notify Provider of Tachysystole (Per Hospital Algorithm)  Until Discontinued         01/18/24 0153    01/18/24 0154  Notify Provider if Membranes Ruptured, Bleeding Greater Than 1 Pad Per Hour, Fetal Heart Tone  Abnormality or Severe Pain  Until Discontinued         24  Initiate Group Beta Strep (GBS) Prophylaxis Protocol, If Criteria Met  Continuous        Comments: NO TREATMENT RECOMMENDED IF: 1) Maternal GBS Status Known Negative 2) Scheduled  Birth With Intact Membranes, Not in Labor 3) Maternal GBS Status Unknown, No Risk Factors  TREAT WITH ANTIBIOTICS IF:  1) Maternal GBS Status Known Positive 2) Maternal GBS Status Unknown With Risk Factors: a)  Previous Infant Affected By GBS Infection b) GBS Urinary Tract Infection (UTI) or Bacteriuria During Pregnancy c) Unexplained Maternal Fever (100.4F (38C) or Greater) During Labor d)  Prolonged Rupture of Membranes (18 or More Hours) e) Gestational Age Less Than 37 Weeks    24  Insert Peripheral IV  Once         24  Saline Lock & Maintain IV Access  Continuous         24  Diet: Liquid Diets; Clear Liquid; Fluid Consistency: Thin (IDDSI 0)  Diet Effective Now,   Status:  Canceled         24  sodium chloride 0.9 % flush 10 mL  As Needed         24  sodium chloride 0.9 % infusion 40 mL  As Needed         24  lidocaine PF 1% (XYLOCAINE) injection 0.5 mL  Once As Needed         24  lactated ringers bolus 1,000 mL  Once As Needed         24  acetaminophen (TYLENOL) tablet 650 mg  Every 4 Hours PRN         243    24  morphine injection 2 mg  Every 2 Hours PRN         243    24  morphine injection 4 mg  Every 2 Hours PRN         243    24  ondansetron ODT (ZOFRAN-ODT) disintegrating tablet 4 mg  Every 6 Hours PRN        See Hyperspace for full Linked Orders Report.    24  ondansetron (ZOFRAN) injection 4 mg  Every 6 Hours PRN        See  Hyperspace for full Linked Orders Report.    01/18/24 0153    01/18/24 0134  VTE Prophylaxis Not Indicated: No Risk Factors (0); </= 3 (Low Risk)  Once         01/18/24 0136    01/13/24 1755  calcium carbonate (TUMS) chewable tablet 500 mg (200 mg elemental)  3 Times Daily PRN         01/13/24 1755    01/12/24 2100  sodium chloride 0.9 % flush 10 mL  Every 12 Hours Scheduled         01/12/24 1411 01/12/24 2045  lactated ringers infusion  Continuous,   Status:  Discontinued         01/12/24 1959 01/12/24 1600  Vital Signs q 4 while awake  Every 4 Hours      Comments: While the patient is awake.    01/12/24 1411 01/12/24 1408  sodium chloride 0.9 % flush 10 mL  As Needed,   Status:  Discontinued         01/12/24 1411 01/12/24 1408  sodium chloride 0.9 % infusion 40 mL  As Needed,   Status:  Discontinued         01/12/24 1411 01/12/24 1408  lidocaine PF 1% (XYLOCAINE) injection 0.5 mL  Once As Needed,   Status:  Discontinued         01/12/24 1411 01/12/24 1408  lactated ringers bolus 1,000 mL  As Needed,   Status:  Discontinued         01/12/24 1411 01/12/24 1408  acetaminophen (TYLENOL) tablet 650 mg  Every 4 Hours PRN,   Status:  Discontinued         01/12/24 1411 01/12/24 1408  calcium carbonate (TUMS) chewable tablet 500 mg (200 mg elemental)  Daily PRN,   Status:  Discontinued         01/12/24 1411    01/12/24 1408  ondansetron ODT (ZOFRAN-ODT) disintegrating tablet 8 mg  Every 8 Hours PRN,   Status:  Discontinued        See Hyperspace for full Linked Orders Report.    01/12/24 1411    01/12/24 1408  ondansetron (ZOFRAN) injection 4 mg  Every 8 Hours PRN,   Status:  Discontinued        See Hyperspace for full Linked Orders Report.    01/12/24 1411    01/12/24 1408  docusate sodium (COLACE) capsule 100 mg  2 Times Daily PRN         01/12/24 1411    01/12/24 1408  bisacodyl (DULCOLAX) suppository 10 mg  Daily PRN         01/12/24 1411    Unscheduled  Position Change - For Intra-Uterine  "Resusitation for Hypertonus, HyperStimulation or Non-Reassuring Fetal Status  As Needed       01/12/24 1411    Unscheduled  Position Change - For Intra-Uterine Resusitation for Hypertonus, HyperStimulation or Non-Reassuring Fetal Status  As Needed       01/18/24 0153    Signed and Held  Notify Provider (Specified)  Until Discontinued         Signed and Held    Signed and Held  Vital Signs Per Hospital Policy  Per Hospital Policy         Signed and Held    Signed and Held  Fundal & Lochia Check  Per Order Details      Comments: Every 15 Minutes x4, Then Every 30 Minutes x2, Then Every Shift    Signed and Held    Signed and Held  Fundal & Lochia Check  Every Shift       Signed and Held    Signed and Held  Diet: Regular/House Diet; Texture: Regular Texture (IDDSI 7); Fluid Consistency: Thin (IDDSI 0)  Diet Effective Now         Signed and Held    Signed and Held  oxytocin (PITOCIN) 30 units in 0.9% sodium chloride 500 mL (premix)  Once        See Hyperspace for full Linked Orders Report.    Signed and Held    Signed and Held  oxytocin (PITOCIN) 30 units in 0.9% sodium chloride 500 mL (premix)  Continuous        See Hyperspace for full Linked Orders Report.    Signed and Held    Signed and Held  methylergonovine (METHERGINE) injection 200 mcg  Once As Needed         Signed and Held    Signed and Held  carboprost (HEMABATE) injection 250 mcg  As Needed         Signed and Held    Signed and Held  miSOPROStol (CYTOTEC) tablet 800 mcg  As Needed         Signed and Held                     Physician Progress Notes (last 48 hours)        Duarte Whitfield MD at 01/18/24 0129            Monica Lees  [id number]  1995      CTSP for c/o contractions and pelvic pressure.  Pt now therapeutic on steroids.  Pt checked earlier today and was 3/80%/0 (~1400).  Pt now with contractions q2-4\".  VE now 4-5/80-90%/-1 with bulging bag.  P/1)transfer to      2)ancef      3)magnesium 1g/hr for neuroprotection     4)discussed " with Dr. Asim Whitfield MD  2024  01:29 EST       Electronically signed by Duarte Whitfield MD at 24 0132       Mary Garcia MD at 24 1644          2024  HD:5  28 y.o. female  at 31w1d    Subjective   Monica feels well. The cramping from last night reslved and she has felt well today. She also had some pink spotting yeterday, but none since.   Good FM.        Objective   Temp: Temp:  [98 °F (36.7 °C)-98.3 °F (36.8 °C)] 98.1 °F (36.7 °C) Temp src: Oral   BP: BP: (106-121)/(65-78) 112/66        Pulse: Heart Rate:  [62-81] 64  RR: Resp:  [16-18] 16    General:  nad   Abdomen: Gravid, nontender         Lab Results   Component Value Date    WBC 12.07 (H) 01/15/2024    HGB 10.8 (L) 01/15/2024    HCT 31.7 (L) 01/15/2024    MCV 95.2 01/15/2024     01/15/2024    HEPBSAG Negative 2023     Results from last 7 days   Lab Units 24  1444   AST (SGOT) U/L 15     Results from last 7 days   Lab Units 24  1444   ALT (SGPT) U/L 10      Results from last 7 days   Lab Units 24  1444   CREATININE mg/dL 0.58      Results from last 7 days   Lab Units 24  1444   BILIRUBIN mg/dL 0.2     Non Stress Test: minutes 20  non-stress test: NST: Reactive  indication: Questionable Labor     Assessment  1.   28 y.o. yo female  at 31w1d  2. ALINE- s/p steroids. Currently stable. Vtx.     Plan  Cont current hospital care.   2. Discussed possibility of going home on bedrest in next couple days if remains stable.     This note has been electronically signed.    Mary Garcia MD  2024    Electronically signed by Mary Garcia MD at 24 1646       Mary Garcia MD at 24 0916          2024  HD:4  28 y.o. female  at 31w0d    Subjective   Monica feels well. Denies contractions, VB. Reports good FM.        Objective   Temp: Temp:  [97.7 °F (36.5 °C)-99 °F (37.2 °C)] 97.7 °F (36.5 °C) Temp src: Oral   BP: BP: (107-120)/(62-71) 120/66         Pulse: Heart Rate:  [57-61] 57  RR: Resp:  [14-16] 14    General:  nad   Abdomen: Gravid, nontender         Lab Results   Component Value Date    WBC 12.07 (H) 01/15/2024    HGB 10.8 (L) 01/15/2024    HCT 31.7 (L) 01/15/2024    MCV 95.2 01/15/2024     01/15/2024    HEPBSAG Negative 2023     Results from last 7 days   Lab Units 24  1444   AST (SGOT) U/L 15     Results from last 7 days   Lab Units 24  1444   ALT (SGPT) U/L 10      Results from last 7 days   Lab Units 24  1444   CREATININE mg/dL 0.58      Results from last  days   Lab Units 24  1444   BILIRUBIN mg/dL 0.2     Non Stress Test: minutes 20 min, Reactive with occasional contractions detected, but not felt by patient.   Indication: ALINE     Assessment  1.   28 y.o. yo female  at 31w0d  2. ALINE 3/8. Stable now s/p steroids and off magnesium. Tonight will be 48 hours off mag.  PDC US  yesterday normal growth, vtx,.     Plan  Cont current hospital care.       This note has been electronically signed.    Mary Garcia MD  2024    Electronically signed by Mary Garcia MD at 24 0919       FHR (last 2 days)       Date/Time Fetal HR Assessment Method Fetal HR (beats/min) Fetal HR Baseline Fetal HR Variability Fetal HR Accelerations Fetal HR Decelerations Fetal HR Tracing Category    24 0630 external 125 normal range moderate (amplitude range 6 to 25 bpm) greater than/equal to 15 bpm;lasting at least 15 seconds absent --    24 0615 external 130 normal range moderate (amplitude range 6 to 25 bpm) greater than/equal to 15 bpm;lasting at least 15 seconds absent --    24 0600 external 125 normal range moderate (amplitude range 6 to 25 bpm) greater than/equal to 15 bpm;lasting at least 15 seconds absent --    24 0545 external 130 normal range moderate (amplitude range 6 to 25 bpm) greater than/equal to 15 bpm;lasting at least 15 seconds absent --    24 0530 external 135 normal  range moderate (amplitude range 6 to 25 bpm) greater than/equal to 15 bpm;lasting at least 15 seconds absent --    01/18/24 0515 external -- indeterminate  Pt ambulated to bathroom. -- -- -- --    01/18/24 0500 external 130 normal range moderate (amplitude range 6 to 25 bpm) greater than/equal to 15 bpm;lasting at least 15 seconds absent --    01/18/24 0445 external 130 normal range moderate (amplitude range 6 to 25 bpm) greater than/equal to 15 bpm;lasting at least 15 seconds absent --    01/18/24 0430 external 125 normal range moderate (amplitude range 6 to 25 bpm) greater than/equal to 15 bpm;lasting at least 15 seconds variable --    01/18/24 0415 external 130 normal range moderate (amplitude range 6 to 25 bpm) greater than/equal to 15 bpm;lasting at least 15 seconds absent --    01/18/24 0400 external -- indeterminate  Pt ambulated to bathroom. -- -- -- --    01/18/24 0345 external 130 normal range moderate (amplitude range 6 to 25 bpm) greater than/equal to 15 bpm;lasting at least 15 seconds absent --    01/18/24 0330 external 130 normal range moderate (amplitude range 6 to 25 bpm) greater than/equal to 15 bpm;lasting at least 15 seconds absent --    01/18/24 0315 external 130 normal range moderate (amplitude range 6 to 25 bpm) greater than/equal to 15 bpm;lasting at least 15 seconds absent --    01/18/24 0300 external 130 normal range moderate (amplitude range 6 to 25 bpm) greater than/equal to 15 bpm;lasting at least 15 seconds absent --    01/18/24 0245 external 130 normal range moderate (amplitude range 6 to 25 bpm) greater than/equal to 15 bpm;lasting at least 15 seconds absent --    01/18/24 0230 external 130 normal range moderate (amplitude range 6 to 25 bpm) greater than/equal to 15 bpm;lasting at least 15 seconds absent --    01/18/24 0215 external 130 normal range moderate (amplitude range 6 to 25 bpm) greater than/equal to 15 bpm;lasting at least 15 seconds absent --    01/18/24 0200 external 130  normal range moderate (amplitude range 6 to 25 bpm) greater than/equal to 15 bpm;lasting at least 15 seconds absent --    01/18/24 0130 external 125 normal range moderate (amplitude range 6 to 25 bpm) greater than/equal to 15 bpm;lasting at least 15 seconds absent --    01/17/24 2230 external 120 normal range moderate (amplitude range 6 to 25 bpm) greater than/equal to 15 bpm;lasting at least 15 seconds absent --    01/17/24 2215 external -- indeterminate  maternal movement at 2204 -- -- -- --    01/17/24 2200 external 125 normal range moderate (amplitude range 6 to 25 bpm) greater than/equal to 15 bpm;lasting at least 15 seconds absent --    01/17/24 2145 external 125 normal range moderate (amplitude range 6 to 25 bpm) greater than/equal to 15 bpm;lasting at least 15 seconds absent --    01/17/24 2130 external 125 normal range moderate (amplitude range 6 to 25 bpm) greater than/equal to 15 bpm;lasting at least 15 seconds absent --    01/17/24 1630 external 130 normal range moderate (amplitude range 6 to 25 bpm) greater than/equal to 15 bpm;lasting at least 15 seconds absent --    01/17/24 1615 external 130 normal range moderate (amplitude range 6 to 25 bpm) greater than/equal to 15 bpm;lasting at least 15 seconds absent --    01/17/24 1600 external 130 normal range moderate (amplitude range 6 to 25 bpm) absent absent --    01/17/24 1545 external 130 normal range moderate (amplitude range 6 to 25 bpm) greater than/equal to 15 bpm;lasting at least 15 seconds absent --    01/17/24 1530 external 130 normal range moderate (amplitude range 6 to 25 bpm) absent absent --    01/17/24 1052 external 135 normal range moderate (amplitude range 6 to 25 bpm) greater than/equal to 15 bpm;lasting at least 15 seconds absent --    01/17/24 1030 external 135 normal range moderate (amplitude range 6 to 25 bpm) greater than/equal to 15 bpm;lasting at least 15 seconds absent --    01/17/24 1000 external 130 normal range moderate  (amplitude range 6 to 25 bpm) greater than/equal to 15 bpm;lasting at least 15 seconds absent --    01/17/24 0930 external 130 normal range moderate (amplitude range 6 to 25 bpm) absent absent --    01/17/24 0900 external 135 normal range moderate (amplitude range 6 to 25 bpm) lasting at least 15 seconds;greater than/equal to 15 bpm absent --    01/17/24 0830 external -- indeterminate  poor tracing -- -- -- --    01/17/24 0800 external 140 normal range moderate (amplitude range 6 to 25 bpm) greater than/equal to 15 bpm;lasting at least 15 seconds absent --    01/17/24 0745 external 135 normal range moderate (amplitude range 6 to 25 bpm) greater than/equal to 15 bpm;lasting at least 15 seconds absent --    01/17/24 0730 external 130 normal range moderate (amplitude range 6 to 25 bpm) greater than/equal to 15 bpm;lasting at least 15 seconds absent --    01/17/24 0715 external 130 normal range moderate (amplitude range 6 to 25 bpm) greater than/equal to 15 bpm;lasting at least 15 seconds absent --    01/17/24 0700 external 130 normal range moderate (amplitude range 6 to 25 bpm) greater than/equal to 15 bpm;lasting at least 15 seconds absent --    01/17/24 0630 external 130 normal range moderate (amplitude range 6 to 25 bpm) greater than/equal to 15 bpm;lasting at least 15 seconds absent --    01/17/24 0600 external 130 normal range moderate (amplitude range 6 to 25 bpm) greater than/equal to 15 bpm;lasting at least 15 seconds absent --    01/17/24 0530 external 130 normal range moderate (amplitude range 6 to 25 bpm) greater than/equal to 15 bpm;lasting at least 15 seconds absent --    01/17/24 0500 external 125 normal range moderate (amplitude range 6 to 25 bpm) greater than/equal to 15 bpm;lasting at least 15 seconds absent --    01/17/24 0430 external 125 normal range moderate (amplitude range 6 to 25 bpm) greater than/equal to 15 bpm;lasting at least 15 seconds absent --    01/17/24 0400 external 130 normal range  moderate (amplitude range 6 to 25 bpm) greater than/equal to 15 bpm;lasting at least 15 seconds absent --    01/17/24 0330 external 125 normal range moderate (amplitude range 6 to 25 bpm) greater than/equal to 15 bpm;lasting at least 15 seconds absent --    01/17/24 0300 external 130 normal range moderate (amplitude range 6 to 25 bpm) greater than/equal to 15 bpm;lasting at least 15 seconds absent --    01/17/24 0230 external 130 normal range moderate (amplitude range 6 to 25 bpm) greater than/equal to 15 bpm;lasting at least 15 seconds variable --    01/17/24 0200 external 130 normal range moderate (amplitude range 6 to 25 bpm) greater than/equal to 15 bpm;lasting at least 15 seconds variable  x1 --    01/17/24 0130 external 130 normal range moderate (amplitude range 6 to 25 bpm) greater than/equal to 15 bpm;lasting at least 15 seconds absent --    01/17/24 0100 external 130 normal range moderate (amplitude range 6 to 25 bpm) greater than/equal to 15 bpm;lasting at least 15 seconds absent --    01/17/24 0030 external 135 normal range moderate (amplitude range 6 to 25 bpm) greater than/equal to 15 bpm;lasting at least 15 seconds absent --    01/17/24 0000 external 135 normal range moderate (amplitude range 6 to 25 bpm) greater than/equal to 15 bpm;lasting at least 15 seconds absent --    01/16/24 2330 external 135 normal range moderate (amplitude range 6 to 25 bpm) greater than/equal to 15 bpm;lasting at least 15 seconds absent --    01/16/24 2300 external 130 normal range moderate (amplitude range 6 to 25 bpm) greater than/equal to 15 bpm;lasting at least 15 seconds absent --    01/16/24 2230 external 130 normal range moderate (amplitude range 6 to 25 bpm) greater than/equal to 15 bpm;lasting at least 15 seconds absent --    01/16/24 2200 external 130 normal range moderate (amplitude range 6 to 25 bpm) greater than/equal to 15 bpm;lasting at least 15 seconds absent --    01/16/24 2130 external 130 normal range  moderate (amplitude range 6 to 25 bpm) greater than/equal to 15 bpm;lasting at least 15 seconds absent --    01/16/24 2100 external 135 normal range moderate (amplitude range 6 to 25 bpm) greater than/equal to 15 bpm;lasting at least 15 seconds absent --    01/16/24 2030 external 135 normal range moderate (amplitude range 6 to 25 bpm) greater than/equal to 15 bpm;lasting at least 15 seconds absent --    01/16/24 2000 external 140 normal range moderate (amplitude range 6 to 25 bpm) greater than/equal to 15 bpm;lasting at least 15 seconds absent --    01/16/24 1945 external 145 normal range moderate (amplitude range 6 to 25 bpm) greater than/equal to 15 bpm;lasting at least 15 seconds absent --    01/16/24 1930 other (see comments) -- indeterminate -- -- -- --    01/16/24 1915 external 140 normal range moderate (amplitude range 6 to 25 bpm) greater than/equal to 15 bpm;lasting at least 15 seconds absent --    01/16/24 1900 external 135 normal range moderate (amplitude range 6 to 25 bpm) greater than/equal to 15 bpm;lasting at least 15 seconds absent --    01/16/24 1845 external 135 normal range moderate (amplitude range 6 to 25 bpm) greater than/equal to 15 bpm;lasting at least 15 seconds absent --    01/16/24 1830 external 140 normal range moderate (amplitude range 6 to 25 bpm) greater than/equal to 15 bpm;lasting at least 15 seconds absent --    01/16/24 1815 external 135 normal range moderate (amplitude range 6 to 25 bpm) greater than/equal to 15 bpm;lasting at least 15 seconds absent --    01/16/24 1800 external 140 normal range moderate (amplitude range 6 to 25 bpm) greater than/equal to 15 bpm;lasting at least 15 seconds absent --    01/16/24 1745 external 140 normal range moderate (amplitude range 6 to 25 bpm) greater than/equal to 15 bpm;lasting at least 15 seconds absent --    01/16/24 1730 external 140 normal range moderate (amplitude range 6 to 25 bpm) greater than/equal to 15 bpm;lasting at least 15  seconds absent --    01/16/24 1715 external 140 normal range moderate (amplitude range 6 to 25 bpm) greater than/equal to 15 bpm;lasting at least 15 seconds absent --    01/16/24 1700 external 140 normal range moderate (amplitude range 6 to 25 bpm) greater than/equal to 15 bpm;lasting at least 15 seconds variable  x1 --    01/16/24 1645 external 140 normal range moderate (amplitude range 6 to 25 bpm) greater than/equal to 15 bpm;lasting at least 15 seconds absent --    01/16/24 1630 external 140 normal range moderate (amplitude range 6 to 25 bpm) greater than/equal to 15 bpm;lasting at least 15 seconds absent --    01/16/24 1615 external 135 normal range moderate (amplitude range 6 to 25 bpm) absent early --    01/16/24 1600 external 140 normal range moderate (amplitude range 6 to 25 bpm) greater than/equal to 15 bpm;lasting at least 15 seconds absent --    01/16/24 1545 external 135 normal range moderate (amplitude range 6 to 25 bpm) greater than/equal to 15 bpm;lasting at least 15 seconds absent --    01/16/24 1530 external 135 normal range moderate (amplitude range 6 to 25 bpm) greater than/equal to 15 bpm;lasting at least 15 seconds absent --    01/16/24 1141 external 135 normal range moderate (amplitude range 6 to 25 bpm) greater than/equal to 15 bpm;lasting at least 15 seconds absent --    01/16/24 1130 external 140 normal range moderate (amplitude range 6 to 25 bpm) greater than/equal to 15 bpm;lasting at least 15 seconds absent --    01/16/24 1115 external 145 normal range moderate (amplitude range 6 to 25 bpm) greater than/equal to 15 bpm;lasting at least 15 seconds variable --    01/16/24 1100 external 140 normal range moderate (amplitude range 6 to 25 bpm) greater than/equal to 15 bpm;lasting at least 15 seconds absent --    01/16/24 1045 external 145 normal range moderate (amplitude range 6 to 25 bpm) greater than/equal to 15 bpm;lasting at least 15 seconds absent --    01/16/24 1030 external 145 normal  range moderate (amplitude range 6 to 25 bpm) greater than/equal to 15 bpm;lasting at least 15 seconds variable --    01/16/24 1015 external 135 normal range moderate (amplitude range 6 to 25 bpm) greater than/equal to 15 bpm;lasting at least 15 seconds absent --    01/16/24 1000 external 135 normal range moderate (amplitude range 6 to 25 bpm) greater than/equal to 15 bpm;lasting at least 15 seconds absent --    01/16/24 0945 external 140 normal range moderate (amplitude range 6 to 25 bpm) greater than/equal to 15 bpm;lasting at least 15 seconds absent --          Uterine Activity (last 2 days)       Date/Time Method Contraction Frequency (Minutes) Contraction Duration (sec) Contraction Intensity Uterine Resting Tone Contraction Pattern    01/18/24 0630 external tocotransducer 4.5-6  -- -- --    01/18/24 0615 external tocotransducer 2-7.5 100-130 -- -- --    01/18/24 0600 external tocotransducer 3-7 120-160 -- -- --    01/18/24 0545 external tocotransducer 2-4  -- -- --    01/18/24 0530 external tocotransducer 3-4  -- -- --    01/18/24 0515 external tocotransducer -- 60 -- -- --    01/18/24 0500 external tocotransducer 6  -- -- --    01/18/24 0445 external tocotransducer 3-7  -- -- --    01/18/24 0430 external tocotransducer 5-6 60-80 -- -- --    01/18/24 0415 external tocotransducer 3.5-7 70-80 -- -- --    01/18/24 0400 external tocotransducer 3 70 -- -- --    01/18/24 0345 external tocotransducer 5-6 50-80 -- -- --    01/18/24 0330 external tocotransducer 4 60-70 -- -- --    01/18/24 0315 external tocotransducer 2-5  -- -- --    01/18/24 0300 external tocotransducer 2-4 60-90 -- -- --    01/18/24 0245 external tocotransducer 3.5-5  -- -- --    01/18/24 0230 external tocotransducer 2.5-4  -- -- --    01/18/24 0215 external tocotransducer 2-4.5  -- -- --    01/18/24 0200 external tocotransducer 3-3.5  -- -- --    01/18/24 0130 external tocotransducer 2-2.5   moderate by palpation soft by palpation --    01/17/24 2230 external tocotransducer x1 60 -- -- --    01/17/24 2215 external tocotransducer x1 60 -- -- --    01/17/24 2200 external tocotransducer -- -- -- -- --    01/17/24 2145 external tocotransducer 3-4  -- -- --    01/17/24 2130 external tocotransducer;palpation 3-4  moderate by palpation soft by palpation --    01/17/24 1634 palpation -- -- -- soft by palpation --    01/17/24 1630 external tocotransducer 5 70-80 -- -- --    01/17/24 1615 external tocotransducer x1 80 -- -- --    01/17/24 1600 external tocotransducer x2 60-80 -- -- --    01/17/24 1545 external tocotransducer x1 80 -- -- --    01/17/24 1530 external tocotransducer x1 70 -- -- --    01/17/24 1523 palpation;per patient report -- -- -- soft by palpation --    01/17/24 1052 external tocotransducer x1 70 -- -- --    01/17/24 1030 external tocotransducer 8 70-90 -- -- --    01/17/24 1000 external tocotransducer x1 60 -- -- --    01/17/24 0930 external tocotransducer 5  -- -- --    01/17/24 0900 external tocotransducer 2-5 40-80 -- -- --    01/17/24 0830 external tocotransducer 2-4  -- -- --    01/17/24 0820 palpation -- -- moderate by palpation soft by palpation --    01/17/24 0800 external tocotransducer 2-5  -- -- --    01/17/24 0745 external tocotransducer 6  -- -- --    01/17/24 0730 external tocotransducer 6.5 60-80 -- -- --    01/17/24 0715 external tocotransducer 7 60-70 -- -- --    01/17/24 0700 external tocotransducer 6-8 50-90 -- -- --    01/17/24 0630 external tocotransducer 6-9  -- -- --    01/17/24 0600 external tocotransducer 3-7  -- -- --    01/17/24 0530 external tocotransducer 5-7  -- -- --    01/17/24 0500 external tocotransducer 1-7  -- -- --    01/17/24 0430 external tocotransducer 6-8  -- -- --    01/17/24 0400 external tocotransducer 8-11  -- -- --    01/17/24 0330 external tocotransducer 6-10  --  -- --    01/17/24 0300 external tocotransducer 7-9 70-90 -- -- --    01/17/24 0230 external tocotransducer 8-9  -- -- --    01/17/24 0200 external tocotransducer 7-10  -- -- --    01/17/24 0130 external tocotransducer 7-9  -- -- --    01/17/24 0100 external tocotransducer 8-11  -- -- --    01/17/24 0030 external tocotransducer 3-10  -- -- --    01/17/24 0000 external tocotransducer 2-8  -- -- --    01/16/24 2330 external tocotransducer 2-7  moderate by palpation soft by palpation --    01/16/24 2300 external tocotransducer -- 70-90 -- -- --    01/16/24 2230 external tocotransducer -- 50-80 -- -- --    01/16/24 2200 external tocotransducer -- 50-80 -- -- --    01/16/24 2130 external tocotransducer -- -- -- -- --    01/16/24 2100 external tocotransducer 1-6 50-80 -- -- --    01/16/24 2030 external tocotransducer 4-6 60-80 -- -- --    01/16/24 2000 external tocotransducer 3-6 60-80 -- -- --    01/16/24 1945 external tocotransducer 4-5 60-80 -- -- --    01/16/24 1930 none  pateint up to bathroom performing self care -- -- -- -- --    01/16/24 1915 external tocotransducer 4-5  moderate by palpation soft by palpation --    01/16/24 1900 external tocotransducer -- -- no contractions -- --    01/16/24 1845 external tocotransducer 3-4 60-90 -- -- --    01/16/24 1830 external tocotransducer -- 60 -- -- --    01/16/24 1815 external tocotransducer 1.5-3  -- -- --    01/16/24 1800 external tocotransducer 1.5-5 60-80 -- -- --    01/16/24 1745 external tocotransducer 8 60-80 -- -- --    01/16/24 1730 external tocotransducer 9  -- -- --    01/16/24 1715 external tocotransducer 7-8  -- -- --    01/16/24 1700 external tocotransducer 5.5-8 60-70 -- -- --    01/16/24 1645 external tocotransducer -- 50-70 -- -- --    01/16/24 1630 external tocotransducer 7 60-70 -- -- --    01/16/24 1615 external tocotransducer 5-5.5 60-80 -- -- --    01/16/24 1607 palpation -- --  moderate by palpation soft by palpation --    01/16/24 1600 external tocotransducer 6.5  -- -- --    01/16/24 1545 external tocotransducer 7 70-80 -- -- --    01/16/24 1530 external tocotransducer 6.5 60-70 -- -- --    01/16/24 1515 palpation -- -- mild by palpation soft by palpation --    01/16/24 1141 external tocotransducer -- 60 -- -- --    01/16/24 1130 external tocotransducer -- 50-60 -- -- --    01/16/24 1115 external tocotransducer -- -- no contractions -- --    01/16/24 1100 external tocotransducer -- 70 -- -- --    01/16/24 1045 external tocotransducer -- 60 -- -- --    01/16/24 1030 external tocotransducer -- 90 -- -- --    01/16/24 1015 external tocotransducer -- 100 -- -- --    01/16/24 1000 external tocotransducer -- -- no contractions -- --    01/16/24 0945 external tocotransducer -- -- no contractions -- --    01/16/24 0936 palpation -- -- -- soft by palpation --    01/16/24 0809 palpation -- -- no contractions soft by palpation --

## 2024-01-18 NOTE — ANESTHESIA PREPROCEDURE EVALUATION
Anesthesia Evaluation     Patient summary reviewed and Nursing notes reviewed   NPO Solid Status: > 6 hours  NPO Liquid Status: > 6 hours           Airway   Dental      Pulmonary - negative pulmonary ROS   Cardiovascular - negative cardio ROS        Neuro/Psych  (+) psychiatric history Anxiety    ROS Comment: Eating disorder  GI/Hepatic/Renal/Endo - negative ROS     Musculoskeletal (-) negative ROS    Abdominal    Substance History - negative use     OB/GYN    (+) Pregnant    Comment:    labor      Other                    Anesthesia Plan    ASA 2     epidural       Anesthetic plan, risks, benefits, and alternatives have been provided, discussed and informed consent has been obtained with: patient.    CODE STATUS:    Level Of Support Discussed With: Patient  Code Status (Patient has no pulse and is not breathing): CPR (Attempt to Resuscitate)  Medical Interventions (Patient has pulse or is breathing): Full Support

## 2024-01-18 NOTE — PROGRESS NOTES
Moved over to labor moses overnight for increasing contractions and CE 4-5cm.. on my exam now she is 5/80. She is feeling more uncomfortable contractions. Will recheck in 2 hours and if cont to have cervical change will proceed with AROM and delivery. She is on Ancef for gbs prophylaxis and magnesium for neuroprotection. Cat 1 tracing.

## 2024-01-18 NOTE — ANESTHESIA PROCEDURE NOTES
Labor Epidural      Patient reassessed immediately prior to procedure    Patient location during procedure: OB  Performed By  CRNA/CAA: Adri Wilkins CRNA  Preanesthetic Checklist  Completed: patient identified, IV checked, risks and benefits discussed, surgical consent, monitors and equipment checked, pre-op evaluation and timeout performed  Additional Notes  DPE-25 gauge Anibal  Prep:  Pt Position:sitting  Sterile Tech:cap, gloves, mask and sterile barrier  Prep:DuraPrep  Monitoring:blood pressure monitoring  Epidural Block Procedure:  Approach:midline  Guidance:palpation technique  Location:L4-L5  Needle Type:Tuohy  Needle Gauge:17 G  Loss of Resistance Medium: saline  Loss of Resistance: 5cm  Cath Depth at skin:12 cm  Paresthesia: none  Aspiration:negative  Test Dose:negative  Number of Attempts: 1  Post Assessment:  Dressing:occlusive dressing applied and secured with tape  Pt Tolerance:patient tolerated the procedure well with no apparent complications  Complications:no

## 2024-01-18 NOTE — L&D DELIVERY NOTE
2024    Patient:Monica Lees    MR#:[id number]    Vaginal Delivery Note  28 y.o. yo female  at 31w2d       labor       Delivery     Delivery:      YOB: 2022    Time of Birth: 4:03 PM      Anesthesia: Epidural     Delivering clinician:     Forceps?   No   Vacuum? No    Shoulder dystocia present: No        Pt progressed to complete, pushed less than 10 min and then delivered without difficulty. Baby vigorous and crying. Delayed cord clamping 30sec, and then cord doubly clamped and cut, and baby handed to waiting NICU staff. Placenta delivered intact.     Infant    Findings: male  infant     Infant observations: Weight: No birth weight on file.     Observations/Comments:        Apgars: 8  @ 1 minute /    8  @ 5 minutes         Placenta, Cord, and Fluid    Placenta delivered  Spontaneous  at  2024  4:06 PM     Cord: 3 vessels  present.   Nuchal Cord?  no   Cord blood obtained: Yes    Cord gases obtained:  Yes    Cord gas results: Pending         Repair    Episiotomy: No   Lacerations: No   Estimated Blood Loss:   100mls.         Complications  none    Disposition  Mother to Mother Baby/Postpartum  in stable condition currently.  Baby to NICU  in stable condition currently.                Mary Garcia MD  24  16:17 EST

## 2024-01-18 NOTE — PROGRESS NOTES
"  Monica Lees  [id number]  1995      CTSP for c/o contractions and pelvic pressure.  Pt now therapeutic on steroids.  Pt checked earlier today and was 3/80%/0 (~1400).  Pt now with contractions q2-4\".  VE now 4-5/80-90%/-1 with bulging bag.  P/1)transfer to      2)ancef      3)magnesium 1g/hr for neuroprotection     4)discussed with Dr. Asim Whitfield MD  1/18/2024  01:29 EST     "

## 2024-01-19 LAB
BASOPHILS # BLD AUTO: 0.07 10*3/MM3 (ref 0–0.2)
BASOPHILS NFR BLD AUTO: 0.4 % (ref 0–1.5)
DEPRECATED RDW RBC AUTO: 43.9 FL (ref 37–54)
EOSINOPHIL # BLD AUTO: 0.11 10*3/MM3 (ref 0–0.4)
EOSINOPHIL NFR BLD AUTO: 0.7 % (ref 0.3–6.2)
ERYTHROCYTE [DISTWIDTH] IN BLOOD BY AUTOMATED COUNT: 12.9 % (ref 12.3–15.4)
HCT VFR BLD AUTO: 34.8 % (ref 34–46.6)
HGB BLD-MCNC: 12 G/DL (ref 12–15.9)
IMM GRANULOCYTES # BLD AUTO: 0.11 10*3/MM3 (ref 0–0.05)
IMM GRANULOCYTES NFR BLD AUTO: 0.7 % (ref 0–0.5)
LYMPHOCYTES # BLD AUTO: 2.63 10*3/MM3 (ref 0.7–3.1)
LYMPHOCYTES NFR BLD AUTO: 16.5 % (ref 19.6–45.3)
MCH RBC QN AUTO: 32.3 PG (ref 26.6–33)
MCHC RBC AUTO-ENTMCNC: 34.5 G/DL (ref 31.5–35.7)
MCV RBC AUTO: 93.8 FL (ref 79–97)
MONOCYTES # BLD AUTO: 0.87 10*3/MM3 (ref 0.1–0.9)
MONOCYTES NFR BLD AUTO: 5.5 % (ref 5–12)
NEUTROPHILS NFR BLD AUTO: 12.15 10*3/MM3 (ref 1.7–7)
NEUTROPHILS NFR BLD AUTO: 76.2 % (ref 42.7–76)
NRBC BLD AUTO-RTO: 0 /100 WBC (ref 0–0.2)
PLATELET # BLD AUTO: 195 10*3/MM3 (ref 140–450)
PMV BLD AUTO: 10.8 FL (ref 6–12)
RBC # BLD AUTO: 3.71 10*6/MM3 (ref 3.77–5.28)
WBC NRBC COR # BLD AUTO: 15.94 10*3/MM3 (ref 3.4–10.8)

## 2024-01-19 PROCEDURE — 85025 COMPLETE CBC W/AUTO DIFF WBC: CPT | Performed by: OBSTETRICS & GYNECOLOGY

## 2024-01-19 RX ADMIN — DOCUSATE SODIUM 100 MG: 100 CAPSULE, LIQUID FILLED ORAL at 07:29

## 2024-01-19 NOTE — PROGRESS NOTES
1/19/2024  PPD #1    Subjective   Monica feels well.  Patient describes her lochia less than menses.  Pain is well controlled       Objective   Temp: Temp:  [98 °F (36.7 °C)-98.8 °F (37.1 °C)] 98.7 °F (37.1 °C) Temp src: Oral   BP: BP: ()/(55-81) 128/78        Pulse: Heart Rate:  [] 89  RR: Resp:  [16-20] 18    General:  No acute distress   Abdomen: Fundus firm and beneath umbilicus   Pelvis: deferred     Lab Results   Component Value Date    WBC 14.49 (H) 01/18/2024    HGB 13.9 01/18/2024    HCT 40.2 01/18/2024    MCV 93.7 01/18/2024     01/18/2024    HEPBSAG Negative 07/27/2023     Results from last 7 days   Lab Units 01/18/24  0219   TREPONEMA PALLIDUM AB TOTAL  Non-Reactive     Assessment  PPD# 1 after vaginal delivery, doing well; am labs pending  Baby boy stable in NICU    Plan  Routine postpartum care.      This note has been electronically signed.    Kaya Payton, APRN  January 19, 2024

## 2024-01-19 NOTE — LACTATION NOTE
I checked in on pt. She is resting and planning to pump around 2230 prior to going to NICU to see baby and requests me to come back around that time.

## 2024-01-19 NOTE — LACTATION NOTE
01/18/24 2230   Maternal Information   Date of Referral 01/18/24   Person Making Referral physician   Maternal Reason for Referral no prior breastfeeding experience;separation from infant   Infant Reason for Referral NICU admission;other (see comments)  (31w 2d)   Maternal Assessment   Breast Size Issue none   Breast Shape Bilateral:;round   Breast Density Bilateral:;soft   Areola Bilateral:;elastic   Nipples Bilateral:;everted   Left Nipple Symptoms intact;nontender   Right Nipple Symptoms intact;nontender   Maternal Infant Feeding   Maternal Preparation hand hygiene   Maternal Emotional State receptive;relaxed   Milk Expression/Equipment   Breast Pump Type double electric, hospital grade   Breast Pump Flange Type hard   Breast Pump Flange Size 21 mm   Breast Pumping   Breast Pumping Interventions early pumping promoted;frequent pumping encouraged   Breast Pumping double electric breast pump utilized     Reviewed NICU packet handouts with parents. Reviewed how to use hospital breast pump, including operation, cleaning and sanitizing, milk collection with syringe and labeling, storage guidelines. Encouraged to pump q. 3 hours around the clock for optimal milk supply. Discussed importance of pumping in a clean manner for NICU baby. Mom was able to pump 0.1ml colostrum. I showed them how to draw up the colostrum with a syringe and how to clean pump parts properly with Medela soap. They verbalized understanding and have no questions at this time. Encouraged to call lactation as questions/concerns arise.

## 2024-01-19 NOTE — ANESTHESIA POSTPROCEDURE EVALUATION
Patient: Monica Lees    Procedure Summary       Date: 01/18/24 Room / Location:     Anesthesia Start: 1441 Anesthesia Stop: 1606    Procedure: LABOR ANALGESIA Diagnosis:     Scheduled Providers:  Provider: Jp Joshi DO    Anesthesia Type: epidural ASA Status: 2            Anesthesia Type: epidural    Vitals  Vitals Value Taken Time   /78 01/19/24 0715   Temp 98.7 °F (37.1 °C) 01/19/24 0715   Pulse 89 01/19/24 0731   Resp 18 01/19/24 0715   SpO2 96 % 01/19/24 0731           Post Anesthesia Care and Evaluation    Patient location during evaluation: bedside  Patient participation: complete - patient participated  Level of consciousness: awake and alert  Pain management: adequate    Airway patency: patent  Anesthetic complications: No anesthetic complications    Cardiovascular status: acceptable  Respiratory status: acceptable  Hydration status: acceptable  Post Neuraxial Block status: Motor and sensory function returned to baseline and No signs or symptoms of PDPH

## 2024-01-19 NOTE — PAYOR COMM NOTE
"Kiran Lees (28 y.o. Female)     Auth#[id number]     Delivery information:  Vaginal Delivery 24 @ 16:03  Wt 1570 gms  Male  Apgars 8/8    To: Maria L    From:Deidre Pace LPN, Utilization Review  Phone #[phone]  Fax #[phone]        Date of Birth   1995    Social Security Number       Address   [address] [place] [address]    Home Phone   [phone]    MRN   [id number]       Hinduism   None    Marital Status                               Admission Date   24    Admission Type   Elective    Admitting Provider   Mary Garcia MD    Attending Provider   Mary Garcia MD    Department, Room/Bed   [facility]AB[facility] 4A, N417/1       Discharge Date       Discharge Disposition       Discharge Destination                                 Attending Provider: Mary Garcia MD    Allergies: Amoxicillin, Penicillins    Isolation: None   Infection: None   Code Status: CPR    Ht: 162.6 cm (64\")   Wt: 60.8 kg (134 lb)    Admission Cmt: None   Principal Problem:  labor [O60.00]                   Active Insurance as of 2024       Primary Coverage       Payor Plan Insurance Group Employer/Plan Group    [employer] BLUE CROSS [facility] BLUE CROSS BLUE SHIELD PPO [id number]       Payor Plan Address Payor Plan Phone Number Payor Plan Fax Number Effective Dates    [address] [phone]  2023 - None Entered    [facility] [id number]         Subscriber Name Subscriber Birth Date Member ID       KIRAN LEES 1995 [id number]                     Emergency Contacts        (Rel.) Home Phone Work Phone Mobile Phone    KEELEY MELARA (Spouse) -- -- [phone]    NEEL ORDONEZ (Sister) -- -- [phone]              Vital Signs (last day)       Date/Time Temp Temp src Pulse Resp BP Patient Position SpO2    24 0731 -- -- 89 -- -- -- 96    24 2355 98.4 (36.9) Oral 77 16 123/75 -- --    24 " 2005 98.6 (37) Oral 119 20 126/70 -- --    01/18/24 1900 98.5 (36.9) Oral 80 16 128/74 -- --    01/18/24 1815 -- -- 85 20 107/55 Lying --    01/18/24 1800 -- -- 80 -- 107/65 -- --    01/18/24 1745 98.8 (37.1) Axillary 73 20 128/75 Lying --    01/18/24 1730 -- -- 71 -- 114/71 -- --    01/18/24 1715 -- -- 78 20 117/75 Lying --    01/18/24 1700 -- -- 75 20 118/68 Lying --    01/18/24 1645 -- -- 78 18 125/75 Lying --    01/18/24 1630 -- -- 82 18 104/71 Lying --    01/18/24 1615 -- -- 86 20 113/77 Lying --    01/18/24 1545 -- -- 81 -- 100/57 -- 99    01/18/24 1530 98 (36.7) Axillary 80 16 94/61 Lying 99    01/18/24 1515 -- -- 88 -- 103/57 -- 98    01/18/24 1500 -- -- 91 -- 105/56 -- 98    01/18/24 1455 -- -- 104 -- 136/68 -- --    01/18/24 1440 -- -- 102 16 112/61 Lying 99    01/18/24 1345 98.3 (36.8) Oral -- -- -- -- --    01/18/24 1324 -- -- 95 18 121/74 Lying 97    01/18/24 1237 -- -- 84 18 127/81 Lying 98    01/18/24 1133 98.7 (37.1) Oral 89 16 116/57 Lying 96    01/18/24 1035 -- -- 82 16 108/67 Lying 98    01/18/24 0931 -- -- 88 16 107/61 Lying 98    01/18/24 0831 -- -- 78 16 125/81 Lying 99    01/18/24 0730 98.1 (36.7) Oral 80 16 126/75 Lying 98    01/18/24 0642 -- -- 77 16 119/83 Sitting 98    01/18/24 0533 -- -- 73 16 119/72 Sitting 97    01/18/24 0429 98.1 (36.7) Oral 66 16 105/56 Lying 98    01/18/24 0332 -- -- 60 14 98/62 Lying 96    01/18/24 0233 -- -- 85 16 116/72 Sitting 99    01/18/24 0156 98.1 (36.7) Oral 93 18 124/70 Sitting --    01/18/24 0108 98 (36.7) Oral 75 16 121/67 Sitting --          Current Facility-Administered Medications   Medication Dose Route Frequency Provider Last Rate Last Admin    acetaminophen (TYLENOL) tablet 650 mg  650 mg Oral Q6H Mary Crews MD        benzocaine (AMERICAINE) 20 % rectal ointment 1 application   1 application  Rectal PRMary Ga MD        benzocaine-menthol (DERMOPLAST) 20-0.5 % topical spray   Topical PRMary Ga MD   Given at 01/18/24 5480     bisacodyl (DULCOLAX) suppository 10 mg  10 mg Rectal Daily PRN Mary Garcia MD        docusate sodium (COLACE) capsule 100 mg  100 mg Oral BID Mary Garcia MD   100 mg at 01/19/24 0729    HYDROcodone-acetaminophen (NORCO) 5-325 MG per tablet 1 tablet  1 tablet Oral Q4H PRMary Ga MD        Or    HYDROcodone-acetaminophen (NORCO)  MG per tablet 1 tablet  1 tablet Oral Q4H PRMary Ga MD        Hydrocortisone (Perianal) (ANUSOL-HC) 2.5 % rectal cream 1 application   1 application  Rectal PRMary Ga MD        ibuprofen (ADVIL,MOTRIN) tablet 600 mg  600 mg Oral Q6H PRMary Ga MD   600 mg at 01/18/24 2005    lanolin topical 1 application   1 application  Topical Q1H PRMary Ga MD   1 application  at 01/18/24 1900    magnesium hydroxide (MILK OF MAGNESIA) 400 MG/5ML suspension 10 mL  10 mL Oral Daily PRMary Ga MD        miSOPROStol (CYTOTEC) tablet 800 mcg  800 mcg Vaginal PRMary Ga MD        ondansetron ODT (ZOFRAN-ODT) disintegrating tablet 4 mg  4 mg Oral Q6H PRMary Ga MD        Or    ondansetron (ZOFRAN) injection 4 mg  4 mg Intravenous Q6H PRMary Ga MD        oxytocin (PITOCIN) 30 units in 0.9% sodium chloride 500 mL (premix)  125 mL/hr Intravenous Continuous PRMary Ga MD        promethazine (PHENERGAN) tablet 12.5 mg  12.5 mg Oral Q4H PRMary Ga MD        sodium chloride 0.9 % flush 1-10 mL  1-10 mL Intravenous PRMary Ga MD        witch hazel-glycerin (TUCKS) pad   Topical PRMary Ga MD   Given at 01/18/24 1900     Lab Results (last 24 hours)       Procedure Component Value Units Date/Time    Tissue Pathology Exam [[id number]] Collected: 01/18/24 1555    Specimen: Tissue from Placenta Updated: 01/19/24 0645    Blood Gas, Venous, Cord [[id number]]  (Abnormal) Collected: 01/18/24 1627    Specimen: Cord Blood Venous from Umbilical Cord Updated: 01/18/24 1627     Site Umbilical     pH, Cord Venous 7.390  pH Units      pCO2, Cord Venous 38.1 mm Hg      pO2, Cord Venous 36.4 mm Hg      HCO3, Cord Venous 23.1 mmol/L      Base Excess, Cord Venous -1.6 mmol/L      O2 Sat, Cord Venous 82.9 %      Hemoglobin, Blood Gas 14.4 g/dL      CO2 Content 24.2 mmol/L      Temperature 37.0     Barometric Pressure for Blood Gas --     Comment: N/A        Modality Room Air     FIO2 21 %      Ventilator Mode --     Rate 0 Breaths/minute      PIP 0 cmH2O      Comment: Meter: [id number]     :  [id number]        IPAP 0     EPAP 0     O2 Saturation Calculated --     Comment: Calculated O2 saturation result not reported at this site.       Blood Gas, Arterial, Cord [[id number]]  (Abnormal) Collected: 01/18/24 1625    Specimen: Cord Blood Arterial from Umbilical Cord Updated: 01/18/24 1626     Site Umbilical     pH, Cord Arterial 7.30 pH Units      pCO2, Cord Arterial 49.5 mmHg      pO2, Cord Arterial 25.5 mmHg      HCO3, Cord Arterial 24.5 mmol/L      Base Exc, Cord Arterial -2.5 mmol/L      O2 Sat, Cord Arterial 56.3 %      Hemoglobin, Blood Gas 14.6 g/dL      CO2 Content 26.0 mmol/L      Temperature 37.0     Barometric Pressure for Blood Gas --     Comment: N/A        Modality Room Air     FIO2 21 %      Ventilator Mode --     Rate 0 Breaths/minute      PIP 0 cmH2O      Comment: Meter: [id number]     :  [id number]        IPAP 0     EPAP 0    T Pallidum Antibody w/ reflex RPR [[id number]]  (Normal) Collected: 01/18/24 0219    Specimen: Blood Updated: 01/18/24 0921     Treponemal AB Total Non-Reactive          Imaging Results (Last 24 Hours)       ** No results found for the last 24 hours. **          Orders (last 24 hrs)        Start     Ordered    01/19/24 0800  Sitz Bath  3 Times Daily        Comments: PRN    01/18/24 1840    01/19/24 0600  CBC & Differential  Timed        Comments: Postpartum Day 1      01/18/24 1840 01/19/24 0600  CBC Auto Differential  PROCEDURE ONCE        Comments: Postpartum Day 1       01/18/24 2202    01/19/24 0000  bisacodyl (DULCOLAX) suppository 10 mg  Daily PRN         01/18/24 1840    01/18/24 2100  docusate sodium (COLACE) capsule 100 mg  2 Times Daily         01/18/24 1840 01/18/24 1841  Code Status and Medical Interventions:  Continuous         01/18/24 1840 01/18/24 1841  Vital Signs Per hospital policy  Per Hospital Policy         01/18/24 1840 01/18/24 1841  Notify Physician  Until Discontinued         01/18/24 1840 01/18/24 1841  Up Ad Rebekah  Until Discontinued         01/18/24 1840 01/18/24 1841  Ambulate Patient  Every Shift       01/18/24 1840 01/18/24 1841  Advance Diet As Tolerated -Regular  Until Discontinued         01/18/24 1840 01/18/24 1841  Fundal and Lochia Check  Per Hospital Policy        Comments: Q 15 min x 4, Q 30 min x 2, then Q Shift    01/18/24 1840 01/18/24 1841  RN to Assess Rh Status & Place RhIG Evaluation Order if Indicated  Continuous         01/18/24 1840 01/18/24 1841  Bladder Assessment  Per Order Details        Comments: Postpartum 1) Upon Admission to Unit & Every 4 Hours PRN Until Voiding. 2) Out of Bed to Void in 8 Hours.    01/18/24 1840 01/18/24 1841  Straight Cath  Per Order Details        Comments: Postpartum: If Distended & Unable to Void, May Repeat Once.    01/18/24 1840 01/18/24 1841  Indwelling Urinary Catheter  Per Order Details        Comments: Postpartum : After Straight Cathed x2 or if Greater Than 1000mL Residual, Insert Indwelling Urinary Catheter Until Further MD Order.    01/18/24 1840 01/18/24 1841  Waffle Cushion  Once        Comments: For perineal discomfort    01/18/24 1840 01/18/24 1841  Breast pump to bed  Once         01/18/24 1840 01/18/24 1841  If indicated -- Please administer RH Immunoglobulin based on results of cord blood evaluation and fetal screen lab tests, pharmacy to dispense  Continuous        Comments: See process instructions for reference range details.    01/18/24 1840     01/18/24 1841  Place Sequential Compression Device  Once         01/18/24 1840 01/18/24 1841  Maintain Sequential Compression Device  Continuous         01/18/24 1840 01/18/24 1840  sodium chloride 0.9 % flush 1-10 mL  As Needed         01/18/24 1840 01/18/24 1840  oxytocin (PITOCIN) 30 units in 0.9% sodium chloride 500 mL (premix)  Continuous PRN         01/18/24 1840 01/18/24 1840  acetaminophen (TYLENOL) tablet 650 mg  Every 6 Hours PRN         01/18/24 1840 01/18/24 1840  HYDROcodone-acetaminophen (NORCO) 5-325 MG per tablet 1 tablet  Every 4 Hours PRN        See Hyperspace for full Linked Orders Report.    01/18/24 1840 01/18/24 1840  HYDROcodone-acetaminophen (NORCO)  MG per tablet 1 tablet  Every 4 Hours PRN        See Hyperspace for full Linked Orders Report.    01/18/24 1840 01/18/24 1840  miSOPROStol (CYTOTEC) tablet 800 mcg  As Needed         01/18/24 1840 01/18/24 1840  magnesium hydroxide (MILK OF MAGNESIA) 400 MG/5ML suspension 10 mL  Daily PRN         01/18/24 1840 01/18/24 1840  lanolin topical 1 application   Every 1 Hour PRN         01/18/24 1840 01/18/24 1840  benzocaine-menthol (DERMOPLAST) 20-0.5 % topical spray  As Needed         01/18/24 1840 01/18/24 1840  witch hazel-glycerin (TUCKS) pad  As Needed         01/18/24 1840 01/18/24 1840  Hydrocortisone (Perianal) (ANUSOL-HC) 2.5 % rectal cream 1 application   As Needed         01/18/24 1840 01/18/24 1840  benzocaine (AMERICAINE) 20 % rectal ointment 1 application   As Needed         01/18/24 1840 01/18/24 1840  ondansetron ODT (ZOFRAN-ODT) disintegrating tablet 4 mg  Every 6 Hours PRN        See Hyperspace for full Linked Orders Report.    01/18/24 1840 01/18/24 1840  ondansetron (ZOFRAN) injection 4 mg  Every 6 Hours PRN        See Hyperspace for full Linked Orders Report.    01/18/24 1840    01/18/24 1840  promethazine (PHENERGAN) tablet 12.5 mg  Every 4 Hours PRN         01/18/24 1840     01/18/24 1652  Tissue Pathology Exam  Once         01/18/24 1651    01/18/24 1651  ibuprofen (ADVIL,MOTRIN) tablet 600 mg  Every 6 Hours PRN         01/18/24 1651    01/18/24 1651  Notify Provider (Specified)  Until Discontinued         01/18/24 1650    01/18/24 1651  Vital Signs Per Hospital Policy  Per Hospital Policy         01/18/24 1650    01/18/24 1651  Fundal & Lochia Check  Per Order Details        Comments: Every 15 Minutes x4, Then Every 30 Minutes x2, Then Every Shift    01/18/24 1650 01/18/24 1651  Fundal & Lochia Check  Every Shift       01/18/24 1650    01/18/24 1651  Diet: Regular/House Diet; Texture: Regular Texture (IDDSI 7); Fluid Consistency: Thin (IDDSI 0)  Diet Effective Now         01/18/24 1650    01/18/24 1651  Nurse may remove epidural catheter after delivery.  Until Discontinued         01/18/24 1650    01/18/24 1651  Transfer to Postpartum When Criteria Met  Until Discontinued         01/18/24 1650 01/18/24 1650  methylergonovine (METHERGINE) injection 200 mcg  Once As Needed,   Status:  Discontinued         01/18/24 1650    01/18/24 1650  carboprost (HEMABATE) injection 250 mcg  As Needed,   Status:  Discontinued         01/18/24 1650    01/18/24 1650  miSOPROStol (CYTOTEC) tablet 800 mcg  As Needed,   Status:  Discontinued         01/18/24 1650    01/18/24 1628  Blood Gas, Venous, Cord  PROCEDURE ONCE         01/18/24 1627    01/18/24 1627  Blood Gas, Arterial, Cord  PROCEDURE ONCE         01/18/24 1625    01/18/24 1617  Transfer Patient  Once         01/18/24 1617    01/18/24 1530  ropivacaine (NAROPIN) 0.2 % injection  Continuous,   Status:  Discontinued         01/18/24 1434    01/18/24 1530  Sod Citrate-Citric Acid (BICITRA) oral solution 30 mL  Once,   Status:  Discontinued         01/18/24 1434    01/18/24 1437  ePHEDrine Sulfate (Pressors) 5 MG/ML injection  - ADS Override Pull  Status:  Discontinued        Note to Pharmacy: Created by cabinet override    01/18/24 9087     01/18/24 1435  Vital Signs Per Anesthesia Guidelines  Per Order Details,   Status:  Canceled        Comments: Every 3 Minutes x20 Minutes Following Epidural Dosing, Then Every 15 Minutes If Stable    01/18/24 1434    01/18/24 1435  Fetal Heart Rate Monitor  Once,   Status:  Canceled         01/18/24 1434    01/18/24 1435  Nurse or anesthesiologist to remain with patient for 15 minutes following dosing.  Until Discontinued,   Status:  Canceled         01/18/24 1434    01/18/24 1435  Facilitate maternal postion on side and maintain uterine displacement.  Until Discontinued,   Status:  Canceled         01/18/24 1434    01/18/24 1435  Consult anesthesia services prior to changing epidural infusion/rate.  Until Discontinued,   Status:  Canceled         01/18/24 1434    01/18/24 1435  Notify physician for the following conditions:  Until Discontinued,   Status:  Canceled         01/18/24 1434    01/18/24 1434  Start IV with #16 or #18 gauge angiocath.  As Needed,   Status:  Canceled       01/18/24 1434    01/18/24 1434  lactated ringers bolus 1,000 mL  As Needed,   Status:  Discontinued         01/18/24 1434    01/18/24 1434  ePHEDrine Sulfate (Pressors) 5 MG/ML injection 10 mg  Every 10 Minutes PRN,   Status:  Discontinued         01/18/24 1434    01/18/24 1434  metoclopramide (REGLAN) injection 10 mg  Once As Needed,   Status:  Discontinued         01/18/24 1434    01/18/24 1434  ondansetron (ZOFRAN) injection 4 mg  Once As Needed,   Status:  Discontinued         01/18/24 1434    01/18/24 1434  famotidine (PEPCID) injection 20 mg  Once As Needed,   Status:  Discontinued         01/18/24 1434    01/18/24 1434  diphenhydrAMINE (BENADRYL) injection 12.5 mg  Every 8 Hours PRN,   Status:  Discontinued         01/18/24 1434    01/18/24 1345  oxytocin (PITOCIN) 30 units in 0.9% sodium chloride 500 mL (premix)  Continuous        See Hyperspace for full Linked Orders Report.    01/18/24 1230    01/18/24 1230  oxytocin (PITOCIN)  30 units in 0.9% sodium chloride 500 mL (premix)  Once,   Status:  Discontinued        See Hyperspace for full Linked Orders Report.    01/18/24 1230    01/18/24 1000  ceFAZolin 1000 mg IVPB in 100 mL NS (MBP)  Every 8 Hours,   Status:  Discontinued        See Hyperspace for full Linked Orders Report.    01/18/24 0153    01/18/24 0400  Vital Signs q 4 while awake  Every 4 Hours,   Status:  Canceled      Comments: While the patient is awake.    01/18/24 0153    01/18/24 0245  sodium chloride 0.9 % flush 10 mL  Every 12 Hours Scheduled,   Status:  Discontinued         01/18/24 0153 01/18/24 0245  lactated ringers infusion  Continuous,   Status:  Discontinued         01/18/24 0153 01/18/24 0245  mineral oil liquid 30 mL  Once,   Status:  Discontinued         01/18/24 0153 01/18/24 0245  magnesium sulfate 20 GM/500ML infusion  Continuous,   Status:  Discontinued         01/18/24 0157    01/18/24 0153  Position Change - For Intra-Uterine Resusitation for Hypertonus, HyperStimulation or Non-Reassuring Fetal Status  As Needed,   Status:  Canceled       01/18/24 0153 01/18/24 0153  sodium chloride 0.9 % flush 10 mL  As Needed,   Status:  Discontinued         01/18/24 0153 01/18/24 0153  sodium chloride 0.9 % infusion 40 mL  As Needed,   Status:  Discontinued         01/18/24 0153 01/18/24 0153  lidocaine PF 1% (XYLOCAINE) injection 0.5 mL  Once As Needed,   Status:  Discontinued         01/18/24 0153 01/18/24 0153  lactated ringers bolus 1,000 mL  Once As Needed,   Status:  Discontinued         01/18/24 0153 01/18/24 0153  acetaminophen (TYLENOL) tablet 650 mg  Every 4 Hours PRN,   Status:  Discontinued         01/18/24 0153 01/18/24 0153  morphine injection 2 mg  Every 2 Hours PRN,   Status:  Discontinued         01/18/24 0153 01/18/24 0153  morphine injection 4 mg  Every 2 Hours PRN,   Status:  Discontinued         01/18/24 0153 01/18/24 0153  ondansetron ODT (ZOFRAN-ODT) disintegrating  tablet 4 mg  Every 6 Hours PRN,   Status:  Discontinued        See Hyperspace for full Linked Orders Report.    24 0153    24 0153  ondansetron (ZOFRAN) injection 4 mg  Every 6 Hours PRN,   Status:  Discontinued        See Hyperspace for full Linked Orders Report.    24 0153    24 1755  calcium carbonate (TUMS) chewable tablet 500 mg (200 mg elemental)  3 Times Daily PRN,   Status:  Discontinued         24 1755    24 2100  sodium chloride 0.9 % flush 10 mL  Every 12 Hours Scheduled,   Status:  Discontinued         24 1411    24 1600  Vital Signs q 4 while awake  Every 4 Hours,   Status:  Canceled      Comments: While the patient is awake.    24 1411    24 1408  Position Change - For Intra-Uterine Resusitation for Hypertonus, HyperStimulation or Non-Reassuring Fetal Status  As Needed,   Status:  Canceled       24 1411    24 1408  docusate sodium (COLACE) capsule 100 mg  2 Times Daily PRN,   Status:  Discontinued         24 1411    24 1408  bisacodyl (DULCOLAX) suppository 10 mg  Daily PRN,   Status:  Discontinued         24 1411    Unscheduled  Apply Ice to Perineum  As Needed      Comments: For 20 min q 2 hrs    24 1840    Unscheduled  Kpad  As Needed      Comments: For pain    24 1840    Unscheduled  Warm compress  As Needed       24 1840    Unscheduled  Apply ace wrap, tight bra, or binder  As Needed       24 1840    Unscheduled  Apply ice packs  As Needed       24 1840                     Operative/Procedure Notes (last 24 hours)        Mary Garcia MD at 24 1617            2024    Patient:Monica Lees    MR#:[id number]    Vaginal Delivery Note  28 y.o. yo female  at 31w2d       labor       Delivery     Delivery:      YOB: 2022    Time of Birth: 4:03 PM      Anesthesia: Epidural     Delivering clinician:     Forceps?   No   Vacuum? No     Shoulder dystocia present: No        Pt progressed to complete, pushed less than 10 min and then delivered without difficulty. Baby vigorous and crying. Delayed cord clamping 30sec, and then cord doubly clamped and cut, and baby handed to waiting NICU staff. Placenta delivered intact.     Infant    Findings: male  infant     Infant observations: Weight: No birth weight on file.     Observations/Comments:        Apgars: 8  @ 1 minute /    8  @ 5 minutes         Placenta, Cord, and Fluid    Placenta delivered  Spontaneous  at  1/18/2024  4:06 PM     Cord: 3 vessels  present.   Nuchal Cord?  no   Cord blood obtained: Yes    Cord gases obtained:  Yes    Cord gas results: Pending         Repair    Episiotomy: No   Lacerations: No   Estimated Blood Loss:   100mls.         Complications  none    Disposition  Mother to Mother Baby/Postpartum  in stable condition currently.  Baby to NICU  in stable condition currently.                Mary Garcia MD  01/18/24  16:17 EST            Electronically signed by Mary Garcia MD at 01/18/24 1620          Physician Progress Notes (last 24 hours)        Mary Garcia MD at 01/18/24 1553          Comfortable with epidural. CE complete/ +3. Will start pushing\ when NICU ready.     Electronically signed by Mary Garcia MD at 01/18/24 1553       Mary Garcia MD at 01/18/24 1044          CE now 6-7/100, bulging bag. Discussed with hue and her , now delivery imminent. AROM performed, clear fluid. NICU aware.     Electronically signed by Mary Garcia MD at 01/18/24 1045       Mary Garcia MD at 01/18/24 0921          Moved over to [facility] overnight for increasing contractions and CE 4-5cm.. on my exam now she is 5/80. She is feeling more uncomfortable contractions. Will recheck in 2 hours and if cont to have cervical change will proceed with AROM and delivery. She is on Ancef for gbs prophylaxis and magnesium for neuroprotection. Cat 1 tracing.    Electronically  signed by Mary Garcia MD at 01/18/24 0991

## 2024-01-19 NOTE — PROGRESS NOTES
"  Clinical Nutrition      Nutrition Assessment  Reason for Visit:   LOS      Patient Name: Monica Lees  YOB: 1995  MRN: [id number]  Date of Encounter: 24 11:43 EST  Admission date: 2024      Comments:  Patient is s/p vaginal delivery on .  Delivered at 31 2/7 weeks.  Infant is in NICU      Nutrition Assessment   Assessment       Applicable diagnosis/conditions/procedures this adm:     labor            Applicable/additional applicable PMH/PSxH:     PMH: She  has a past medical history of Anxiety, Eating disorder, and Poison ivy dermatitis.   PSxH: She  has a past surgical history that includes Frenulum Clipping.         Reported/Observed/Food/Nutrition Related History:     Patient not in room at time of visit.    Anthropometrics     Height: 162.6 cm (64\")  Last filed wt: Weight: 60.8 kg (134 lb) (24 1525)  Weight Method: Stated    BMI: BMI (Calculated): 23  Normal: 18.5-24.9kg/m2    Ideal Body Weight (IBW) (kg): 55      Weight Change   UBW:  Weight change:   % wt change:   Time frame of weight loss:     Labs reviewed     Results from last 7 days   Lab Units 24  1444   CREATININE mg/dL 0.58   ALT (SGPT) U/L 10       Lab Results   Lab Value Date/Time    HGBA1C 4.6 2023 1559    HGBA1C 5.3 2020 1203         Medications reviewed   Pertinent:  Colace        Intake/Ouptut 24 hrs (7:00AM - 6:59 AM)     Intake & Output (last day)          0701   0700  0701   0700    I.V. (mL/kg) 1613.3 (26.5)     IV Piggyback      Total Intake(mL/kg) 1613.3 (26.5)     Urine (mL/kg/hr) 2000 (1.4)     Stool 0     Blood 100     Total Output 2100     Net -486.7           Stool Unmeasured Occurrence 2 x           Current Nutrition Prescription     PO: Diet: Regular/House Diet; Texture: Regular Texture (IDDSI 7); Fluid Consistency: Thin (IDDSI 0)  No active supplement orders    Intake (average):  No intake recorded    Nutrition Diagnosis "     1/19/23  Problem No nutrition diagnosis at this time   Etiology    Signs/Symptoms      Nutrition Intervention     1.  Follow treatment progress  2. Care plan reviewed  3. Will address nutrition education needs/discharge planning needs as clinically appropriate     Goal:   General: Provide information regarding MNT therapy  PO: Meet estimated needs    Monitoring/Evaluation:   Per protocol, I&O, PO intake, Pertinent labs, Weight, Skin status, GI status      Will Continue to follow per protocol      Monica Landaverde RD  Time Spent: 25 minutes

## 2024-01-19 NOTE — LACTATION NOTE
Mom pumping q 3 hours for NICU infant.  Does not have personal breast pump at home yet.  Offered spectra pump from stock.  Mom to do research and let lactation know if she desires spectra pump.  Mom obtaining drops with pumping.  Encouraged STS as much as NICU allows today and pumping after STS or NICU visits.  Goal is 8 pumps in 24 hours.

## 2024-01-20 VITALS
HEART RATE: 76 BPM | OXYGEN SATURATION: 96 % | TEMPERATURE: 98.1 F | BODY MASS INDEX: 22.88 KG/M2 | DIASTOLIC BLOOD PRESSURE: 76 MMHG | SYSTOLIC BLOOD PRESSURE: 111 MMHG | RESPIRATION RATE: 16 BRPM | HEIGHT: 64 IN | WEIGHT: 134 LBS

## 2024-01-20 RX ORDER — IBUPROFEN 600 MG/1
600 TABLET ORAL EVERY 6 HOURS PRN
Qty: 30 TABLET | Refills: 0 | Status: SHIPPED | OUTPATIENT
Start: 2024-01-20

## 2024-01-20 RX ADMIN — WITCH HAZEL: 500 SOLUTION RECTAL; TOPICAL at 09:54

## 2024-01-20 RX ADMIN — DOCUSATE SODIUM 100 MG: 100 CAPSULE, LIQUID FILLED ORAL at 09:32

## 2024-01-20 RX ADMIN — DOCUSATE SODIUM 100 MG: 100 CAPSULE, LIQUID FILLED ORAL at 00:20

## 2024-01-20 NOTE — PLAN OF CARE
Goal Outcome Evaluation:  Plan of Care Reviewed With: patient, spouse        Progress: improving  Outcome Evaluation: Vitals WNL. Pt denies pain. She ambulates in room independently. Fundus has been U/2, firm, and midline. Light rubra present. Pt visits infant in NICU. Pt to D/C home today.

## 2024-01-20 NOTE — LACTATION NOTE
01/19/24 2208   Maternal Information   Date of Referral 01/19/24   Person Making Referral patient   Infant Reason for Referral NICU admission   Milk Expression/Equipment   Breast Pump Type double electric, hospital grade   Breast Pump Flange Type hard   Breast Pump Flange Size 21 mm;other (see comments)  (16mm NS x2 given due to small nipple size, pt states she will order 18 mm flanges)   Breast Pumping   Breast Pumping Interventions frequent pumping encouraged     Courtesy follow up visit per patient request. Pt inquiring about 18mm flanges for pump. Advised pt we do not stock 18mm flanges but gave pt 16mm NS x2 to place in 21mm flange to see if less painful. Encouraged to lubricate flanges as well to see if that decreases discomfort. Encouraged to call out PRN through hospitalization and with outpatient clinic after discharge PRN, verbalized understanding. No further questions at this time.

## 2024-01-20 NOTE — DISCHARGE SUMMARY
Discharge Summary    Date of Admission: 2024  Date of Discharge:  2024      Patient: Monica Lees      MR#:[id number]    Delivery Provider: Mary Garcia       Presenting Problem/History of Present Illness   labor [O60.00]        labor         Discharge Diagnosis: Vaginal delivery at 31w2d    Procedures:  Vaginal, Spontaneous     2024    4:03 PM        Hospital Course  Patient is a 28 y.o. female  at 31w2d status post vaginal delivery from  labor without complication. Postpartum the patient did well. She remained afebrile, with vital signs stable. She was ready for discharge on postpartum day 2.     Infant:   male  fetus 1570 g (3 lb 7.4 oz)  with Apgar scores of 8 , 8  at five minutes.    Condition on Discharge:  Stable    Vital Signs  Temp:  [98.1 °F (36.7 °C)] 98.1 °F (36.7 °C)  Heart Rate:  [76-97] 76  Resp:  [16] 16  BP: (109-119)/(62-76) 111/76    Lab Results   Component Value Date    WBC 15.94 (H) 2024    HGB 12.0 2024    HCT 34.8 2024    MCV 93.8 2024     2024       Discharge Disposition  Home or Self Care    Discharge Medications     Discharge Medications        New Medications        Instructions Start Date   ibuprofen 600 MG tablet  Commonly known as: ADVIL,MOTRIN   600 mg, Oral, Every 6 Hours PRN             Continue These Medications        Instructions Start Date   Prenatal 6.75-0.2 MG tablet   Oral               Follow-up Appointments  Future Appointments   Date Time Provider Department Center   2024  4:00 PM Mary Garcia MD [facility]     Additional Instructions for the Follow-ups that You Need to Schedule       Discharge Follow-up with Specified Provider: beena; 6 Weeks   As directed      To: beena   Follow Up: 6 Weeks                Kaya Payton, APRN  24  10:02 EST  [facility]

## 2024-01-20 NOTE — LACTATION NOTE
Mom is pumping q 3 hours and getting out drops.  Explained normal expectation for milk supply.  Nipples were tender, pumping painful last night but mom reports improvement now.  Ordered a breast pump with insurance and has received her pump for preemie from NICU.  Encouraged mom to call for lactation prn and to follow up with our outpatient lactation clinic ~ 1 week after infant is discharged.

## 2024-01-20 NOTE — PLAN OF CARE
Goal Outcome Evaluation:  Plan of Care Reviewed With: patient        Progress: improving   Vss- vdng- +bm- pumping- lochia wnl- dc today

## 2024-01-22 LAB
CYTO UR: NORMAL
LAB AP CASE REPORT: NORMAL
LAB AP CLINICAL INFORMATION: NORMAL
PATH REPORT.FINAL DX SPEC: NORMAL
PATH REPORT.GROSS SPEC: NORMAL

## 2024-02-28 ENCOUNTER — POSTPARTUM VISIT (OUTPATIENT)
Dept: OBSTETRICS AND GYNECOLOGY | Facility: CLINIC | Age: 29
End: 2024-02-28
Payer: COMMERCIAL

## 2024-02-28 VITALS
DIASTOLIC BLOOD PRESSURE: 60 MMHG | WEIGHT: 122 LBS | SYSTOLIC BLOOD PRESSURE: 98 MMHG | BODY MASS INDEX: 20.83 KG/M2 | HEIGHT: 64 IN

## 2024-02-28 PROBLEM — Z34.90 PREGNANCY: Status: RESOLVED | Noted: 2023-10-11 | Resolved: 2024-02-28

## 2024-02-28 PROBLEM — O60.00 PRETERM LABOR: Status: RESOLVED | Noted: 2024-01-12 | Resolved: 2024-02-28

## 2024-02-28 PROCEDURE — 0503F POSTPARTUM CARE VISIT: CPT | Performed by: OBSTETRICS & GYNECOLOGY

## 2024-02-28 RX ORDER — NORGESTIMATE AND ETHINYL ESTRADIOL 7DAYSX3 28
1 KIT ORAL DAILY
Qty: 28 TABLET | Refills: 12 | Status: SHIPPED | OUTPATIENT
Start: 2024-02-28 | End: 2025-02-27

## 2024-02-28 NOTE — PROGRESS NOTES
"        Chief Complaint   Patient presents with    Postpartum Care       Postpartum Visit         Monica Badillo is a 28 y.o.  who presents today for a 6 week(s) postpartum check.     C/S: no     Vaginal, Spontaneous    Information for the patient's :  Chuy Lees [[id number]]   2024   male   Chuy Lees   1570 g (3 lb 7.4 oz)   Gestational Age: 31w2d        Baby Discharged:  Was in NICU, discharged home yesterday  Delivering Physician: Mary Garcia MD    Her pregnancy was complicated by  premature labor . The patient did not have a laceration or episiotomy   Patient describes vaginal bleeding as absent.  Patient is breast and bottle feeding.  She is undecided on what she would like for contraception.      She would like to discuss the following complaints today: none.    Patient denies concerns for postpartum depression/anxiety. Patient denies  suicidal or homicidal ideation. Her postpartum depression screening questionnaire: 1. No treatment is indicated      Last Pap : 2022. Results: negative. HPV: unknown .   Last Completed Pap Smear       This patient has no relevant Health Maintenance data.              The additional following portions of the patient's history were reviewed and updated as appropriate: allergies, current medications, past family history, past medical history, past social history, past surgical history, and problem list.    Review of Systems  All other systems reviewed and are negative.     I have reviewed and agree with the HPI, ROS, and historical information as entered above. Mary Garcia MD      BP 98/60   Ht 162.6 cm (64\")   Wt 55.3 kg (122 lb)   LMP  (LMP Unknown)   Breastfeeding Yes   BMI 20.94 kg/m²     Physical Exam  Vitals and nursing note reviewed. Exam conducted with a chaperone present.   Constitutional:       Appearance: She is well-developed.   HENT:      Head: Normocephalic and atraumatic.   Pulmonary:      Effort: " Pulmonary effort is normal.   Abdominal:      Palpations: Abdomen is soft. Abdomen is not rigid.   Genitourinary:     Vagina: No lesions or prolapsed vaginal walls.      Cervix: No lesion or erythema.      Uterus: Enlarged. Not tender and no uterine prolapse.       Adnexa:         Right: No mass, tenderness or fullness.          Left: No mass, tenderness or fullness.     Musculoskeletal:      Cervical back: Normal range of motion.   Neurological:      Mental Status: She is alert and oriented to person, place, and time.   Psychiatric:         Mood and Affect: Mood normal.         Behavior: Behavior normal.             Assessment and Plan    Problem List Items Addressed This Visit    None  Visit Diagnoses       Postpartum exam    -  Primary    Relevant Orders    LIQUID-BASED PAP SMEAR WITH HPV GENOTYPING REGARDLESS OF INTERPRETATION ([facility])            S/p Vaginal delivery, 6 week(s) postpartum.  Doing well.    Return to normal physical activity.  No pelvic restrictions.   Baby doing well.  No si/sx of postpartum depression  Contraception: contraceptive methods: OCP (estrogen/progesterone)  Return for Annual physical.     Mary Garcia MD  02/28/2024   Answers submitted by the patient for this visit:  Other (Submitted on 2/27/2024)  Please describe your symptoms.: 6 week follow up  Have you had these symptoms before?: No  How long have you been having these symptoms?: Greater than 2 weeks  Please list any medications you are currently taking for this condition.: N/a  Primary Reason for Visit (Submitted on 2/27/2024)  What is the primary reason for your visit?: Other

## 2024-03-05 LAB — REF LAB TEST METHOD: NORMAL
